# Patient Record
Sex: MALE | Race: WHITE | NOT HISPANIC OR LATINO | URBAN - METROPOLITAN AREA
[De-identification: names, ages, dates, MRNs, and addresses within clinical notes are randomized per-mention and may not be internally consistent; named-entity substitution may affect disease eponyms.]

---

## 2018-07-04 ENCOUNTER — HOSPITAL ENCOUNTER (INPATIENT)
Facility: MEDICAL CENTER | Age: 68
LOS: 4 days | DRG: 121 | End: 2018-07-08
Attending: INTERNAL MEDICINE | Admitting: INTERNAL MEDICINE
Payer: COMMERCIAL

## 2018-07-04 ENCOUNTER — HOSPITAL ENCOUNTER (OUTPATIENT)
Facility: MEDICAL CENTER | Age: 68
DRG: 121 | End: 2018-07-04
Payer: COMMERCIAL

## 2018-07-04 PROBLEM — C61 PROSTATE CANCER (HCC): Chronic | Status: ACTIVE | Noted: 2018-07-04

## 2018-07-04 PROBLEM — R79.89 LOW TESTOSTERONE: Chronic | Status: ACTIVE | Noted: 2018-07-04

## 2018-07-04 PROBLEM — H05.011 CELLULITIS OF RIGHT ORBITAL REGION: Status: ACTIVE | Noted: 2018-07-04

## 2018-07-04 PROBLEM — J44.9 CHRONIC OBSTRUCTIVE PULMONARY DISEASE (COPD) (HCC): Status: ACTIVE | Noted: 2018-07-04

## 2018-07-04 PROBLEM — F17.200 TOBACCO USE DISORDER: Chronic | Status: ACTIVE | Noted: 2018-07-04

## 2018-07-04 PROBLEM — I10 HYPERTENSION: Chronic | Status: ACTIVE | Noted: 2018-07-04

## 2018-07-04 PROBLEM — K21.9 GASTROESOPHAGEAL REFLUX DISEASE WITHOUT ESOPHAGITIS: Chronic | Status: ACTIVE | Noted: 2018-07-04

## 2018-07-04 LAB
ANION GAP SERPL CALC-SCNC: 8 MMOL/L (ref 0–11.9)
BUN SERPL-MCNC: 21 MG/DL (ref 8–22)
CALCIUM SERPL-MCNC: 9.9 MG/DL (ref 8.5–10.5)
CHLORIDE SERPL-SCNC: 102 MMOL/L (ref 96–112)
CO2 SERPL-SCNC: 28 MMOL/L (ref 20–33)
CREAT SERPL-MCNC: 1.17 MG/DL (ref 0.5–1.4)
GLUCOSE SERPL-MCNC: 114 MG/DL (ref 65–99)
POTASSIUM SERPL-SCNC: 4.5 MMOL/L (ref 3.6–5.5)
SODIUM SERPL-SCNC: 138 MMOL/L (ref 135–145)

## 2018-07-04 PROCEDURE — 700102 HCHG RX REV CODE 250 W/ 637 OVERRIDE(OP): Performed by: INTERNAL MEDICINE

## 2018-07-04 PROCEDURE — 93010 ELECTROCARDIOGRAM REPORT: CPT | Performed by: INTERNAL MEDICINE

## 2018-07-04 PROCEDURE — 700105 HCHG RX REV CODE 258: Performed by: INTERNAL MEDICINE

## 2018-07-04 PROCEDURE — 80048 BASIC METABOLIC PNL TOTAL CA: CPT

## 2018-07-04 PROCEDURE — A9270 NON-COVERED ITEM OR SERVICE: HCPCS | Performed by: OPHTHALMOLOGY

## 2018-07-04 PROCEDURE — 36415 COLL VENOUS BLD VENIPUNCTURE: CPT

## 2018-07-04 PROCEDURE — 770006 HCHG ROOM/CARE - MED/SURG/GYN SEMI*

## 2018-07-04 PROCEDURE — 93005 ELECTROCARDIOGRAM TRACING: CPT | Performed by: INTERNAL MEDICINE

## 2018-07-04 PROCEDURE — A9270 NON-COVERED ITEM OR SERVICE: HCPCS | Performed by: INTERNAL MEDICINE

## 2018-07-04 PROCEDURE — 700111 HCHG RX REV CODE 636 W/ 250 OVERRIDE (IP): Performed by: INTERNAL MEDICINE

## 2018-07-04 PROCEDURE — 87040 BLOOD CULTURE FOR BACTERIA: CPT | Mod: 91

## 2018-07-04 PROCEDURE — 700102 HCHG RX REV CODE 250 W/ 637 OVERRIDE(OP): Performed by: OPHTHALMOLOGY

## 2018-07-04 RX ORDER — FAMOTIDINE 20 MG/1
20 TABLET, FILM COATED ORAL DAILY
Status: DISCONTINUED | OUTPATIENT
Start: 2018-07-05 | End: 2018-07-08 | Stop reason: HOSPADM

## 2018-07-04 RX ORDER — OXYCODONE HYDROCHLORIDE AND ACETAMINOPHEN 5; 325 MG/1; MG/1
1 TABLET ORAL EVERY 4 HOURS PRN
Status: DISCONTINUED | OUTPATIENT
Start: 2018-07-04 | End: 2018-07-06

## 2018-07-04 RX ORDER — IBUPROFEN 600 MG/1
600 TABLET ORAL EVERY 6 HOURS PRN
Status: ON HOLD | COMMUNITY
End: 2018-07-08

## 2018-07-04 RX ORDER — CHLORTHALIDONE 25 MG/1
12.5 TABLET ORAL
Status: DISCONTINUED | OUTPATIENT
Start: 2018-07-05 | End: 2018-07-08 | Stop reason: HOSPADM

## 2018-07-04 RX ORDER — FAMOTIDINE 20 MG/1
20 TABLET, FILM COATED ORAL
COMMUNITY

## 2018-07-04 RX ORDER — ACETAMINOPHEN 325 MG/1
650 TABLET ORAL EVERY 6 HOURS PRN
Status: DISCONTINUED | OUTPATIENT
Start: 2018-07-04 | End: 2018-07-08 | Stop reason: HOSPADM

## 2018-07-04 RX ORDER — TAMSULOSIN HYDROCHLORIDE 0.4 MG/1
0.4 CAPSULE ORAL DAILY
COMMUNITY

## 2018-07-04 RX ORDER — BICALUTAMIDE 50 MG/1
50 TABLET, FILM COATED ORAL DAILY
COMMUNITY

## 2018-07-04 RX ORDER — ALBUTEROL SULFATE 90 UG/1
2 AEROSOL, METERED RESPIRATORY (INHALATION) EVERY 6 HOURS PRN
COMMUNITY

## 2018-07-04 RX ORDER — HEPARIN SODIUM 5000 [USP'U]/ML
5000 INJECTION, SOLUTION INTRAVENOUS; SUBCUTANEOUS EVERY 8 HOURS
Status: DISCONTINUED | OUTPATIENT
Start: 2018-07-04 | End: 2018-07-08 | Stop reason: HOSPADM

## 2018-07-04 RX ORDER — TAMSULOSIN HYDROCHLORIDE 0.4 MG/1
0.4 CAPSULE ORAL
Status: DISCONTINUED | OUTPATIENT
Start: 2018-07-04 | End: 2018-07-08 | Stop reason: HOSPADM

## 2018-07-04 RX ORDER — NICOTINE 21 MG/24HR
21 PATCH, TRANSDERMAL 24 HOURS TRANSDERMAL
Status: DISCONTINUED | OUTPATIENT
Start: 2018-07-05 | End: 2018-07-08 | Stop reason: HOSPADM

## 2018-07-04 RX ORDER — POTASSIUM CHLORIDE 20 MEQ/1
40 TABLET, EXTENDED RELEASE ORAL 2 TIMES DAILY
COMMUNITY

## 2018-07-04 RX ORDER — CARVEDILOL 25 MG/1
25 TABLET ORAL 2 TIMES DAILY WITH MEALS
COMMUNITY

## 2018-07-04 RX ORDER — CHLORTHALIDONE 25 MG/1
12.5 TABLET ORAL DAILY
COMMUNITY

## 2018-07-04 RX ORDER — AMOXICILLIN 250 MG
2 CAPSULE ORAL 2 TIMES DAILY
Status: DISCONTINUED | OUTPATIENT
Start: 2018-07-04 | End: 2018-07-08 | Stop reason: HOSPADM

## 2018-07-04 RX ORDER — CHOLECALCIFEROL (VITAMIN D3) 125 MCG
2000 CAPSULE ORAL DAILY
COMMUNITY

## 2018-07-04 RX ORDER — BISACODYL 10 MG
10 SUPPOSITORY, RECTAL RECTAL
Status: DISCONTINUED | OUTPATIENT
Start: 2018-07-04 | End: 2018-07-08 | Stop reason: HOSPADM

## 2018-07-04 RX ORDER — M-VIT,TX,IRON,MINS/CALC/FOLIC 27MG-0.4MG
1 TABLET ORAL DAILY
COMMUNITY

## 2018-07-04 RX ORDER — AMLODIPINE BESYLATE 5 MG/1
5 TABLET ORAL DAILY
COMMUNITY

## 2018-07-04 RX ORDER — POLYETHYLENE GLYCOL 3350 17 G/17G
1 POWDER, FOR SOLUTION ORAL
Status: DISCONTINUED | OUTPATIENT
Start: 2018-07-04 | End: 2018-07-08 | Stop reason: HOSPADM

## 2018-07-04 RX ORDER — LABETALOL HYDROCHLORIDE 5 MG/ML
10 INJECTION, SOLUTION INTRAVENOUS EVERY 4 HOURS PRN
Status: DISCONTINUED | OUTPATIENT
Start: 2018-07-04 | End: 2018-07-08 | Stop reason: HOSPADM

## 2018-07-04 RX ORDER — ACETAMINOPHEN 500 MG
500 TABLET ORAL 2 TIMES DAILY PRN
COMMUNITY

## 2018-07-04 RX ORDER — DORZOLAMIDE HYDROCHLORIDE AND TIMOLOL MALEATE 20; 5 MG/ML; MG/ML
1 SOLUTION/ DROPS OPHTHALMIC 2 TIMES DAILY
Status: DISCONTINUED | OUTPATIENT
Start: 2018-07-04 | End: 2018-07-08 | Stop reason: HOSPADM

## 2018-07-04 RX ORDER — CARVEDILOL 25 MG/1
25 TABLET ORAL 2 TIMES DAILY WITH MEALS
Status: DISCONTINUED | OUTPATIENT
Start: 2018-07-04 | End: 2018-07-08 | Stop reason: HOSPADM

## 2018-07-04 RX ORDER — ALBUTEROL SULFATE 90 UG/1
2 AEROSOL, METERED RESPIRATORY (INHALATION) EVERY 4 HOURS PRN
Status: DISCONTINUED | OUTPATIENT
Start: 2018-07-04 | End: 2018-07-08 | Stop reason: HOSPADM

## 2018-07-04 RX ORDER — OXYCODONE HYDROCHLORIDE AND ACETAMINOPHEN 5; 325 MG/1; MG/1
1-2 TABLET ORAL EVERY 4 HOURS PRN
Status: ON HOLD | COMMUNITY
End: 2018-07-08

## 2018-07-04 RX ORDER — AMLODIPINE BESYLATE 5 MG/1
5 TABLET ORAL
Status: DISCONTINUED | OUTPATIENT
Start: 2018-07-05 | End: 2018-07-08 | Stop reason: HOSPADM

## 2018-07-04 RX ADMIN — PIPERACILLIN AND TAZOBACTAM 4.5 G: 4; .5 INJECTION, POWDER, LYOPHILIZED, FOR SOLUTION INTRAVENOUS; PARENTERAL at 21:26

## 2018-07-04 RX ADMIN — CARVEDILOL 25 MG: 25 TABLET, FILM COATED ORAL at 20:08

## 2018-07-04 RX ADMIN — OXYCODONE HYDROCHLORIDE AND ACETAMINOPHEN 1 TABLET: 5; 325 TABLET ORAL at 19:12

## 2018-07-04 RX ADMIN — TAMSULOSIN HYDROCHLORIDE 0.4 MG: 0.4 CAPSULE ORAL at 20:08

## 2018-07-04 RX ADMIN — PIPERACILLIN AND TAZOBACTAM 4.5 G: 4; .5 INJECTION, POWDER, LYOPHILIZED, FOR SOLUTION INTRAVENOUS; PARENTERAL at 20:07

## 2018-07-04 RX ADMIN — OXYCODONE HYDROCHLORIDE AND ACETAMINOPHEN 1 TABLET: 5; 325 TABLET ORAL at 23:19

## 2018-07-04 RX ADMIN — HEPARIN SODIUM 5000 UNITS: 5000 INJECTION, SOLUTION INTRAVENOUS; SUBCUTANEOUS at 20:09

## 2018-07-04 RX ADMIN — DORZOLAMIDE HYDROCHLORIDE AND TIMOLOL MALEATE 1 DROP: 20; 5 SOLUTION/ DROPS OPHTHALMIC at 22:07

## 2018-07-04 RX ADMIN — VANCOMYCIN HYDROCHLORIDE 1200 MG: 100 INJECTION, POWDER, LYOPHILIZED, FOR SOLUTION INTRAVENOUS at 23:08

## 2018-07-04 ASSESSMENT — COPD QUESTIONNAIRES
DURING THE PAST 4 WEEKS HOW MUCH DID YOU FEEL SHORT OF BREATH: NONE/LITTLE OF THE TIME
DO YOU EVER COUGH UP ANY MUCUS OR PHLEGM?: NO/ONLY WITH OCCASIONAL COLDS OR INFECTIONS
IN THE PAST 12 MONTHS DO YOU DO LESS THAN YOU USED TO BECAUSE OF YOUR BREATHING PROBLEMS: AGREE
HAVE YOU SMOKED AT LEAST 100 CIGARETTES IN YOUR ENTIRE LIFE: YES
COPD SCREENING SCORE: 5

## 2018-07-04 ASSESSMENT — ENCOUNTER SYMPTOMS
CHILLS: 0
BACK PAIN: 0
FOCAL WEAKNESS: 0
SPUTUM PRODUCTION: 0
PHOTOPHOBIA: 0
LOSS OF CONSCIOUSNESS: 0
BLURRED VISION: 1
SENSORY CHANGE: 0
EYE PAIN: 0
SEIZURES: 0
WEAKNESS: 0
EYE DISCHARGE: 0
WEIGHT LOSS: 0
HEADACHES: 1
DOUBLE VISION: 0
NERVOUS/ANXIOUS: 0
ABDOMINAL PAIN: 0
CONSTIPATION: 0
EYE REDNESS: 0
FEVER: 0
WHEEZING: 1
DIAPHORESIS: 0
DIARRHEA: 0
SHORTNESS OF BREATH: 1
MYALGIAS: 0
DIZZINESS: 0
COUGH: 0
VOMITING: 0
SINUS PAIN: 0
PALPITATIONS: 0
BLOOD IN STOOL: 0
INSOMNIA: 0
SPEECH CHANGE: 0
HEMOPTYSIS: 0

## 2018-07-04 ASSESSMENT — LIFESTYLE VARIABLES
EVER HAD A DRINK FIRST THING IN THE MORNING TO STEADY YOUR NERVES TO GET RID OF A HANGOVER: YES
HAVE PEOPLE ANNOYED YOU BY CRITICIZING YOUR DRINKING: NO
ON A TYPICAL DAY WHEN YOU DRINK ALCOHOL HOW MANY DRINKS DO YOU HAVE: 2
EVER_SMOKED: YES
ALCOHOL_USE: YES
AVERAGE NUMBER OF DAYS PER WEEK YOU HAVE A DRINK CONTAINING ALCOHOL: 7
EVER FELT BAD OR GUILTY ABOUT YOUR DRINKING: NO
TOTAL SCORE: 1
CONSUMPTION TOTAL: POSITIVE
HOW MANY TIMES IN THE PAST YEAR HAVE YOU HAD 5 OR MORE DRINKS IN A DAY: 20
HAVE YOU EVER FELT YOU SHOULD CUT DOWN ON YOUR DRINKING: NO
TOTAL SCORE: 1
TOTAL SCORE: 1

## 2018-07-04 ASSESSMENT — COGNITIVE AND FUNCTIONAL STATUS - GENERAL
SUGGESTED CMS G CODE MODIFIER DAILY ACTIVITY: CH
DAILY ACTIVITIY SCORE: 24
SUGGESTED CMS G CODE MODIFIER MOBILITY: CH
MOBILITY SCORE: 24

## 2018-07-04 ASSESSMENT — PATIENT HEALTH QUESTIONNAIRE - PHQ9
SUM OF ALL RESPONSES TO PHQ9 QUESTIONS 1 AND 2: 0
SUM OF ALL RESPONSES TO PHQ9 QUESTIONS 1 AND 2: 0
2. FEELING DOWN, DEPRESSED, IRRITABLE, OR HOPELESS: NOT AT ALL
2. FEELING DOWN, DEPRESSED, IRRITABLE, OR HOPELESS: NOT AT ALL
1. LITTLE INTEREST OR PLEASURE IN DOING THINGS: NOT AT ALL
1. LITTLE INTEREST OR PLEASURE IN DOING THINGS: NOT AT ALL

## 2018-07-04 ASSESSMENT — PAIN SCALES - GENERAL
PAINLEVEL_OUTOF10: 6
PAINLEVEL_OUTOF10: 10
PAINLEVEL_OUTOF10: 8

## 2018-07-04 NOTE — PROGRESS NOTES
Patient is a direct admit form the [facility].   Dr Jim, [facility]  is accepting the patient.   ADT signed and held, needs to be released when the patient arrives on the unit.

## 2018-07-05 ENCOUNTER — APPOINTMENT (OUTPATIENT)
Dept: RADIOLOGY | Facility: MEDICAL CENTER | Age: 68
DRG: 121 | End: 2018-07-05
Attending: INTERNAL MEDICINE
Payer: COMMERCIAL

## 2018-07-05 LAB
ALBUMIN SERPL BCP-MCNC: 3.4 G/DL (ref 3.2–4.9)
ALBUMIN/GLOB SERPL: 1.2 G/DL
ALP SERPL-CCNC: 89 U/L (ref 30–99)
ALT SERPL-CCNC: 12 U/L (ref 2–50)
ANION GAP SERPL CALC-SCNC: 9 MMOL/L (ref 0–11.9)
APTT PPP: 31.8 SEC (ref 24.7–36)
AST SERPL-CCNC: 17 U/L (ref 12–45)
BASOPHILS # BLD AUTO: 0.7 % (ref 0–1.8)
BASOPHILS # BLD: 0.06 K/UL (ref 0–0.12)
BILIRUB SERPL-MCNC: 0.7 MG/DL (ref 0.1–1.5)
BUN SERPL-MCNC: 16 MG/DL (ref 8–22)
CALCIUM SERPL-MCNC: 8.9 MG/DL (ref 8.5–10.5)
CHLORIDE SERPL-SCNC: 104 MMOL/L (ref 96–112)
CO2 SERPL-SCNC: 22 MMOL/L (ref 20–33)
CREAT SERPL-MCNC: 0.9 MG/DL (ref 0.5–1.4)
EKG IMPRESSION: NORMAL
EOSINOPHIL # BLD AUTO: 0.19 K/UL (ref 0–0.51)
EOSINOPHIL NFR BLD: 2.1 % (ref 0–6.9)
ERYTHROCYTE [DISTWIDTH] IN BLOOD BY AUTOMATED COUNT: 48.1 FL (ref 35.9–50)
GLOBULIN SER CALC-MCNC: 2.9 G/DL (ref 1.9–3.5)
GLUCOSE SERPL-MCNC: 87 MG/DL (ref 65–99)
HCT VFR BLD AUTO: 39.7 % (ref 42–52)
HGB BLD-MCNC: 13.4 G/DL (ref 14–18)
IMM GRANULOCYTES # BLD AUTO: 0.05 K/UL (ref 0–0.11)
IMM GRANULOCYTES NFR BLD AUTO: 0.6 % (ref 0–0.9)
INR PPP: 1.12 (ref 0.87–1.13)
LYMPHOCYTES # BLD AUTO: 1.53 K/UL (ref 1–4.8)
LYMPHOCYTES NFR BLD: 17.3 % (ref 22–41)
MAGNESIUM SERPL-MCNC: 1.8 MG/DL (ref 1.5–2.5)
MCH RBC QN AUTO: 31.7 PG (ref 27–33)
MCHC RBC AUTO-ENTMCNC: 33.8 G/DL (ref 33.7–35.3)
MCV RBC AUTO: 93.9 FL (ref 81.4–97.8)
MONOCYTES # BLD AUTO: 0.83 K/UL (ref 0–0.85)
MONOCYTES NFR BLD AUTO: 9.4 % (ref 0–13.4)
NEUTROPHILS # BLD AUTO: 6.18 K/UL (ref 1.82–7.42)
NEUTROPHILS NFR BLD: 69.9 % (ref 44–72)
NRBC # BLD AUTO: 0 K/UL
NRBC BLD-RTO: 0 /100 WBC
PLATELET # BLD AUTO: 185 K/UL (ref 164–446)
PMV BLD AUTO: 10.9 FL (ref 9–12.9)
POTASSIUM SERPL-SCNC: 3.6 MMOL/L (ref 3.6–5.5)
PROT SERPL-MCNC: 6.3 G/DL (ref 6–8.2)
PROTHROMBIN TIME: 14.1 SEC (ref 12–14.6)
RBC # BLD AUTO: 4.23 M/UL (ref 4.7–6.1)
SODIUM SERPL-SCNC: 135 MMOL/L (ref 135–145)
VANCOMYCIN TROUGH SERPL-MCNC: 28.9 UG/ML (ref 10–20)
WBC # BLD AUTO: 8.8 K/UL (ref 4.8–10.8)

## 2018-07-05 PROCEDURE — 80202 ASSAY OF VANCOMYCIN: CPT

## 2018-07-05 PROCEDURE — 700111 HCHG RX REV CODE 636 W/ 250 OVERRIDE (IP): Performed by: INTERNAL MEDICINE

## 2018-07-05 PROCEDURE — 85610 PROTHROMBIN TIME: CPT

## 2018-07-05 PROCEDURE — 770006 HCHG ROOM/CARE - MED/SURG/GYN SEMI*

## 2018-07-05 PROCEDURE — 700105 HCHG RX REV CODE 258: Performed by: INTERNAL MEDICINE

## 2018-07-05 PROCEDURE — 700111 HCHG RX REV CODE 636 W/ 250 OVERRIDE (IP): Performed by: STUDENT IN AN ORGANIZED HEALTH CARE EDUCATION/TRAINING PROGRAM

## 2018-07-05 PROCEDURE — 700101 HCHG RX REV CODE 250: Performed by: OPHTHALMOLOGY

## 2018-07-05 PROCEDURE — 36415 COLL VENOUS BLD VENIPUNCTURE: CPT

## 2018-07-05 PROCEDURE — A9270 NON-COVERED ITEM OR SERVICE: HCPCS | Performed by: STUDENT IN AN ORGANIZED HEALTH CARE EDUCATION/TRAINING PROGRAM

## 2018-07-05 PROCEDURE — 85730 THROMBOPLASTIN TIME PARTIAL: CPT

## 2018-07-05 PROCEDURE — 700102 HCHG RX REV CODE 250 W/ 637 OVERRIDE(OP): Performed by: INTERNAL MEDICINE

## 2018-07-05 PROCEDURE — 99223 1ST HOSP IP/OBS HIGH 75: CPT | Mod: GC | Performed by: INTERNAL MEDICINE

## 2018-07-05 PROCEDURE — 83735 ASSAY OF MAGNESIUM: CPT

## 2018-07-05 PROCEDURE — 85025 COMPLETE CBC W/AUTO DIFF WBC: CPT

## 2018-07-05 PROCEDURE — A9270 NON-COVERED ITEM OR SERVICE: HCPCS | Performed by: INTERNAL MEDICINE

## 2018-07-05 PROCEDURE — 80053 COMPREHEN METABOLIC PANEL: CPT

## 2018-07-05 PROCEDURE — 700102 HCHG RX REV CODE 250 W/ 637 OVERRIDE(OP): Performed by: STUDENT IN AN ORGANIZED HEALTH CARE EDUCATION/TRAINING PROGRAM

## 2018-07-05 RX ORDER — ONDANSETRON 2 MG/ML
4 INJECTION INTRAMUSCULAR; INTRAVENOUS EVERY 4 HOURS PRN
Status: DISCONTINUED | OUTPATIENT
Start: 2018-07-05 | End: 2018-07-08 | Stop reason: HOSPADM

## 2018-07-05 RX ORDER — ERYTHROMYCIN 5 MG/G
OINTMENT OPHTHALMIC 3 TIMES DAILY
Status: DISCONTINUED | OUTPATIENT
Start: 2018-07-05 | End: 2018-07-08 | Stop reason: HOSPADM

## 2018-07-05 RX ORDER — ONDANSETRON 4 MG/1
8 TABLET, ORALLY DISINTEGRATING ORAL EVERY 8 HOURS PRN
Status: DISCONTINUED | OUTPATIENT
Start: 2018-07-05 | End: 2018-07-05

## 2018-07-05 RX ORDER — POTASSIUM CHLORIDE 20 MEQ/1
40 TABLET, EXTENDED RELEASE ORAL ONCE
Status: COMPLETED | OUTPATIENT
Start: 2018-07-05 | End: 2018-07-05

## 2018-07-05 RX ORDER — LORAZEPAM 0.5 MG/1
0.5 TABLET ORAL
Status: COMPLETED | OUTPATIENT
Start: 2018-07-05 | End: 2018-07-05

## 2018-07-05 RX ADMIN — DORZOLAMIDE HYDROCHLORIDE AND TIMOLOL MALEATE 1 DROP: 20; 5 SOLUTION/ DROPS OPHTHALMIC at 09:29

## 2018-07-05 RX ADMIN — AMLODIPINE BESYLATE 5 MG: 5 TABLET ORAL at 09:29

## 2018-07-05 RX ADMIN — STANDARDIZED SENNA CONCENTRATE AND DOCUSATE SODIUM 2 TABLET: 8.6; 5 TABLET, FILM COATED ORAL at 09:31

## 2018-07-05 RX ADMIN — VITAMIN D, TAB 1000IU (100/BT) 2000 UNITS: 25 TAB at 09:28

## 2018-07-05 RX ADMIN — LORAZEPAM 0.5 MG: 0.5 TABLET ORAL at 19:55

## 2018-07-05 RX ADMIN — HEPARIN SODIUM 5000 UNITS: 5000 INJECTION, SOLUTION INTRAVENOUS; SUBCUTANEOUS at 06:00

## 2018-07-05 RX ADMIN — PIPERACILLIN AND TAZOBACTAM 4.5 G: 4; .5 INJECTION, POWDER, LYOPHILIZED, FOR SOLUTION INTRAVENOUS; PARENTERAL at 23:44

## 2018-07-05 RX ADMIN — TAMSULOSIN HYDROCHLORIDE 0.4 MG: 0.4 CAPSULE ORAL at 17:25

## 2018-07-05 RX ADMIN — ONDANSETRON 4 MG: 2 INJECTION, SOLUTION INTRAMUSCULAR; INTRAVENOUS at 11:33

## 2018-07-05 RX ADMIN — OXYCODONE HYDROCHLORIDE AND ACETAMINOPHEN 1 TABLET: 5; 325 TABLET ORAL at 09:45

## 2018-07-05 RX ADMIN — OXYCODONE HYDROCHLORIDE AND ACETAMINOPHEN 1 TABLET: 5; 325 TABLET ORAL at 14:27

## 2018-07-05 RX ADMIN — ERYTHROMYCIN: 5 OINTMENT OPHTHALMIC at 19:56

## 2018-07-05 RX ADMIN — CARVEDILOL 25 MG: 25 TABLET, FILM COATED ORAL at 17:23

## 2018-07-05 RX ADMIN — CARVEDILOL 25 MG: 25 TABLET, FILM COATED ORAL at 09:28

## 2018-07-05 RX ADMIN — NICOTINE 21 MG: 21 PATCH, EXTENDED RELEASE TRANSDERMAL at 06:00

## 2018-07-05 RX ADMIN — DORZOLAMIDE HYDROCHLORIDE AND TIMOLOL MALEATE 1 DROP: 20; 5 SOLUTION/ DROPS OPHTHALMIC at 19:56

## 2018-07-05 RX ADMIN — PIPERACILLIN AND TAZOBACTAM 4.5 G: 4; .5 INJECTION, POWDER, LYOPHILIZED, FOR SOLUTION INTRAVENOUS; PARENTERAL at 16:09

## 2018-07-05 RX ADMIN — OXYCODONE HYDROCHLORIDE AND ACETAMINOPHEN 1 TABLET: 5; 325 TABLET ORAL at 06:12

## 2018-07-05 RX ADMIN — STANDARDIZED SENNA CONCENTRATE AND DOCUSATE SODIUM 2 TABLET: 8.6; 5 TABLET, FILM COATED ORAL at 19:55

## 2018-07-05 RX ADMIN — ACETAMINOPHEN 650 MG: 325 TABLET, FILM COATED ORAL at 00:50

## 2018-07-05 RX ADMIN — ERYTHROMYCIN: 5 OINTMENT OPHTHALMIC at 11:41

## 2018-07-05 RX ADMIN — PIPERACILLIN AND TAZOBACTAM 4.5 G: 4; .5 INJECTION, POWDER, LYOPHILIZED, FOR SOLUTION INTRAVENOUS; PARENTERAL at 06:00

## 2018-07-05 RX ADMIN — OXYCODONE HYDROCHLORIDE AND ACETAMINOPHEN 1 TABLET: 5; 325 TABLET ORAL at 19:19

## 2018-07-05 RX ADMIN — FAMOTIDINE 20 MG: 20 TABLET ORAL at 09:28

## 2018-07-05 RX ADMIN — VANCOMYCIN HYDROCHLORIDE 1200 MG: 100 INJECTION, POWDER, LYOPHILIZED, FOR SOLUTION INTRAVENOUS at 11:35

## 2018-07-05 RX ADMIN — POTASSIUM CHLORIDE 40 MEQ: 1500 TABLET, EXTENDED RELEASE ORAL at 06:13

## 2018-07-05 RX ADMIN — CHLORTHALIDONE 12.5 MG: 25 TABLET ORAL at 09:28

## 2018-07-05 RX ADMIN — HEPARIN SODIUM 5000 UNITS: 5000 INJECTION, SOLUTION INTRAVENOUS; SUBCUTANEOUS at 14:24

## 2018-07-05 ASSESSMENT — PAIN SCALES - GENERAL
PAINLEVEL_OUTOF10: 10
PAINLEVEL_OUTOF10: 6
PAINLEVEL_OUTOF10: 8
PAINLEVEL_OUTOF10: 10
PAINLEVEL_OUTOF10: 10
PAINLEVEL_OUTOF10: 6
PAINLEVEL_OUTOF10: 8
PAINLEVEL_OUTOF10: 6

## 2018-07-05 ASSESSMENT — ENCOUNTER SYMPTOMS
WEAKNESS: 0
BLURRED VISION: 0
LOSS OF CONSCIOUSNESS: 0
SENSORY CHANGE: 0
ABDOMINAL PAIN: 0
EYE DISCHARGE: 0
SEIZURES: 0
FEVER: 0
PHOTOPHOBIA: 0
MYALGIAS: 0
SPEECH CHANGE: 0
CONSTIPATION: 0
DIARRHEA: 0
PND: 0
EYE PAIN: 1
EYE REDNESS: 1
BACK PAIN: 0
BRUISES/BLEEDS EASILY: 0
FOCAL WEAKNESS: 0
SPUTUM PRODUCTION: 1
SORE THROAT: 0
PALPITATIONS: 0
COUGH: 1
INSOMNIA: 0
DOUBLE VISION: 0
ORTHOPNEA: 0
NERVOUS/ANXIOUS: 0
CHILLS: 0
VOMITING: 0
HEADACHES: 0
SINUS PAIN: 0

## 2018-07-05 NOTE — CARE PLAN
Problem: Infection  Goal: Will remain free from infection    Intervention: Assess signs and symptoms of infection  Assessing and monitoring patient vital signs and lab values. Patient receiving IV vancomycin and zosyn ABX, refer to MAR. Using standard precautions, while providing care to patient.       Problem: Pain Management  Goal: Pain level will decrease to patient's comfort goal    Intervention: Follow pain managment plan developed in collaboration with patient and Interdisciplinary Team  Monitoring patient pain level. Patient states 10/10 right eye pain, patient medicated with PRN pain medication, refer to MAR. Patient educated to notify RN if pain is worsen, patient verbalizes understanding.

## 2018-07-05 NOTE — RESPIRATORY CARE
COPD EDUCATION by COPD CLINICAL EDUCATOR  7/5/2018 at 7:32 AM by Danni Oneill     Patient reviewed by COPD education team. Patient does not qualify for COPD program at this time

## 2018-07-05 NOTE — ASSESSMENT & PLAN NOTE
- Pt on Chlorthalidone, Norvasc, Coreg.  - K level is 3.2 today, to continue thiazide and Norvasc on discharge.  - BP is 145/92 today, no headache, blurred vision, dizziness, sweating.  - HR has been trending in normal range  - Pt advised about cardiovascular benefits of smoking cessation.

## 2018-07-05 NOTE — H&P
SENIOR ADMIT NOTE    DATE OF SERVICE:  07/04/2018    CHIEF COMPLAINT:  Swelling of right eye, pain of right eye.    HISTORY OF PRESENT ILLNESS:  In brief, this is a very pleasant 68-year-old   male with past medical history significant for metastatic prostate cancer,   COPD, alcohol-induced pancreatitis, hypertension, tobacco use disorder,   presents as a transfer from the [facility] to [facility] for further evaluation of   orbital cellulitis by an ophthalmologist.    Patient's symptoms began on Monday, when he started having right eye pain and   swelling.  He states that it progressively grew worse and was finally   evaluated by home health nurse who advised him to go into the ER.  He   describes the pain as sharp, stabbing, throbbing, 10/10 and radiating to his   right frontal, temporal regions.  Only thing which relieved the pain is   Percocet, which [facility].  He denies fevers, chills,   drainage from the eyes, nausea, or vomiting.  He denies any recent trauma to   the eye.  He does endorse right eye blurriness, photosensitivity, and pain   with right eye movement.    [facility] CT of the orbit showed a preseptal and orbital cellulitis and he   was started on vancomycin and Zosyn.  Since there was no ophthalmology   consultation at the [facility] during a holiday, he was transferred over here to   [facility] for ophthalmological evaluation.  He did not meet SIRS criteria [facility] the   [facility].    PAST MEDICAL HISTORY:  1.  Chronic obstructive pulmonary disease.  2.  Hypertension.  3.  History of alcohol-induced pancreatitis.  4.  Stage IV prostate cancer, currently under remission.  5.  Tobacco use disorder.  6.  Gastroesophageal reflux disease.    PAST SURGICAL HISTORY:  Denies previous history of surgeries.    FAMILY HISTORY:  Reports esophageal cancer in brother, pancreatic cancer in   sister.    SOCIAL HISTORY:  Smokes 1 pack per day, drinks 2 glasses of gin per day and   denies any recreational drug  use.    MEDICATIONS:  1.  Albuterol q. 4 hours p.r.n.  2.  Amlodipine 5 mg daily.  3.  Bicalutamide 50 mg daily for prostate.  4.  Carvedilol 25 mg b.i.d.  5.  Chlorthalidone 12.5 mg daily.  6.  Cholecalciferol 2000 units daily.  7.  Famotidine 20 mg daily p.r.n.  8.  Magnesium oxide 420 mg daily.  9.  Nicotine patch.  10.  Olodaterol 2.5 mcg 2 puffs by mouth daily.  11.  Tamsulosin 0.4 mg daily.  12.  Vancomycin.  13.  Zosyn.    ALLERGIES:  No known drug allergies.    REVIEW OF SYSTEMS:  CONSTITUTIONAL:  Denies fevers, chills.  EYES:  Reports blurriness, pain with eye movement.  HENT:  Denies nasal congestion, sore throat.  RESPIRATORY:  Reports chronic dyspnea due to COPD, chronic cough due to COPD.  CARDIOVASCULAR:  Denies chest pain, palpitations.  GASTROINTESTINAL:  Denies nausea, vomiting, abdominal pain.  GENITOURINARY:  Denies dysuria, increased frequency.  NEUROLOGICAL:  Denies focal deficits, seizures.    PHYSICAL EXAMINATION:  VITAL SIGNS:  On admission, afebrile, heart rate 61, respiratory rate 16,   blood pressure 123/63, O2 saturation of 92% on room air.  CONSTITUTIONAL:  Appears comfortable.  HENT:  Normocephalic and atraumatic.  EYES:  Right eye swelling and erythema noticed.  Full range of EOM present,   mild photosensitivity present.  Erythema noticed around the right eye.    Patient is able to open both eyes.  CARDIOVASCULAR:  Regular rate and rhythm.  No murmurs, rubs, or gallops.  RESPIRATORY:  Clear to auscultation bilaterally.  ABDOMEN:  Soft, nontender.  Normal bowel sounds.  EXTREMITIES:  No pedal edema.  NEUROLOGIC:  No focal deficits noticed, alert and oriented x3.    LABORATORY DATA:  Significant for WBC count of 9.01, hemoglobin of 13.9,   platelet count of 202.  Potassium was 3.4, BUN of 17, creatinine of 1.1, CO2   of 28, glucose of 96.    IMAGING:  CT of the orbits with and without contrast [facility] the [facility], which   showed preseptal and orbital cellulitis with paranasal sinus  involvement.    ASSESSMENT AND PLAN:  1.  Orbital cellulitis.  The patient does appear to have orbital cellulitis   per imaging from the [facility], does have full range of motion, currently also has   some pain with movement, able to fully open his eyes.  Visual testing at the   [facility] showed 20/32 by Snellen chart.  He will need aggressive antibiotic   coverage, and therefore, vancomycin and Zosyn were continued on admission.    Blood cultures have been ordered, although there were no systemic inflammatory   response syndrome criteria detected.  Ophthalmology was consulted and Dr. Casarez has kindly agreed to see the patient.  For further evaluation, an MRI   with and without contrast of the orbits was also ordered and pending at this   moment. He will also have Q4hr cranial nerve checks for the moment.  2.  Hypokalemia.  Potassium was 3.4 at the [facility], was replaced at the [facility].  We   will repeat BMP and replete if necessary.  3.  Hypertension, continue patient's home medications of amlodipine,   carvedilol, and chlorthalidone.  Blood pressure was stable on admission.  4.  Chronic obstructive pulmonary disease:  Currently stable, not aggravated.    Continue albuterol p.r.n. for shortness of breath.  He is on olodaterol at   home, but this drug does not appear to on forumulary at [facility].  5.  Gastroesophageal reflux disease:  Continue Pepcid.  6.  Tobacco use disorder:  Nicotine patch was ordered, counseled the patient   extensively via tobacco smoking cessation.  7.  Alcohol use disorder:  Patient reports to drinking 2 drinks daily.  Does   not appear to be withdrawing at the moment, however, low threshold to be   maintained.  He does not report history of withdrawals when he was   hospitalized in the hospital previously.    CODE STATUS:  DNR.    Core measures have been addressed appropriately.  Please refer to the intern's   history and physical note for more details.       ____________________________________     Colton Hogan  MD JACKSON / GRAYSON    DD:  07/04/2018 19:22:19  DT:  07/04/2018 21:23:31    D#:  [id number]  Job#:  [id number]

## 2018-07-05 NOTE — ASSESSMENT & PLAN NOTE
- [facility] shows [facility] and extensive paranasal sinus involvement  - Pt has refused to undergo MRI due to claustrophobia   - Pt exhibits no signs of altered sensorium, is oriented x3  - No external ophthalmoplegia, EOMs painless  - Inflammation around eye has significantly diminished today, no conjunctival injection  - Pt has b/l miosis unreactive to light or accomodation, has had so documented since being evaluated at the [facility]  - Pt does not give h/o opiate use/ prior stroke  - Pt switched over to PO Levaquin during discharge  - Dorzolamide-Timolol eyedrops and Erythromycin topical to continue as per Ophthalmology  - Temperatures and WBC count trending in normal range  - Conservative management in absence of extension of infection beyond preseptal compartment  - Ophthalmology consulted, pt to be discharged today

## 2018-07-05 NOTE — CONSULTS
DATE OF SERVICE:  07/04/2018    OPHTHALMOLOGY CONSULTATION    CHIEF COMPLAINT:  Swelling around the right eye.    HISTORY OF PRESENT ILLNESS:  This is a patient who [facility]   for swelling around the right eye with some concern for orbital cellulitis.    The patient had a [facility] at the [facility], which was concerning and was transferred   to [facility].  Per patient, he has some pain in the eye.  The vision is doing   well.  He did not have any pain when he was moving his eye.  Past ocular   history is unremarkable.    PHYSICAL EXAMINATION:  Exam showed visual acuity were normal with near exam.    IOP in the right eye was 28, left eye was 21.  Motility was full.  Right   eyelid has swelling superiorly and inferiorly.  1+ conjunctival injection in   the right eye.  Cornea was clear.  Anterior chamber was deep and quiet.  Left   eye exam was within normal limits.  Pupil was round with no APD.    ASSESSMENT:  This is a patient with preseptal cellulitis with some concern for   orbital cellulitis per CT [facility] the [facility], but full motility at this point   with no pain on eye movements.  Patient had elevated intraocular pressure,   likely from orbital swelling.  The pressure is not very high and the patient   does not require a canthotomy at this point.    PLAN:  1.  Lower intraocular pressure with Cosopt drop, 1 drop twice a day in the   right eye.  2.  Erythromycin ointment 3 times a day, right eye.  3.  Continue IV antibiotics until cellulitis resolves.  4.  We will follow the patient in the next 1-2 days.       ____________________________________     MD CHARLES ESTRADA / GRAYSON    DD:  07/04/2018 23:44:53  DT:  07/05/2018 00:45:34    D#:  [id number]  Job#:  [id number]

## 2018-07-05 NOTE — CARE PLAN
Problem: Safety  Goal: Will remain free from injury  Outcome: PROGRESSING AS EXPECTED  Fall precautions/hourly rounding maintained, call light within reach and functioning, all items within reach.  Patient encouraged to call for assistance, poc reviewed with patient, ?'s/concerns answered.

## 2018-07-05 NOTE — PROGRESS NOTES
"Pharmacy Kinetics 68 y.o. male on vancomycin day # 1 2018    Currently on Vancomycin new start    Indication for Treatment: orbital cellulitis    Pertinent history per medical record: Admitted on 2018 for orbital cellulitis.  [facility] and Unasyn. He transferred to [facility] for higher level of care. Patient had redness, swelling and pain around his R eye with blurred vision. He was found to have orbital cellulitis with [facility] Empiric antibiotics initiated for orbital cellulitis.     Other antibiotics: Zosyn 4.5 g IV q8h    Allergies: Patient has no known allergies.     List concerns for renal function: age, BMI 35    Pertinent cultures to date:    PBC x 2: in process    Recent Labs      18   1847   BUN  21   CREATININE  1.17      Blood pressure 123/63, pulse 61, temperature 35.9 °C (96.6 °F), resp. rate 16, height 1.702 m (5' 7\"), weight 105.7 kg (233 lb 0.4 oz), SpO2 92 %. Temp (24hrs), Av.9 °C (96.6 °F), Min:35.9 °C (96.6 °F), Max:35.9 °C (96.6 °F)      A/P   1. Vancomycin dose change: initiate 1200 mg IV q12h  2. Next vancomycin level:  @ 1030  3. Goal trough: 12-16 mcg/mL  4. Comments: patient started on vancomycin at [facility]. Received 2600 mg IV @ 2300 7/3, followed by 1200 mg @ 1100 . This is slightly below our dosing recommendations, but considering BMI 35, will continue with this dose for now. Level planned for tomorrow AM (prior to 4th total dose - including doses from [facility]). Unknown baseline renal function, but SCr here 1.17. Blood cultures in process. Opthalmology to see patient. Further imaging pending. Will follow.    Laura Crow, PharmD    "

## 2018-07-05 NOTE — PROGRESS NOTES
IV present, no lines otherwise, no skin breakdown noted. Right heel dry/calloused. Patient independent in room - steady gait. Turns and repositions independently.

## 2018-07-05 NOTE — ASSESSMENT & PLAN NOTE
- Cough present with mild expiratory wheeze, non productive  - No dyspnea at rest, chest is clear today  - Pt counseled about smoking cessation and impact on mortality, has refused smoking cessation therapy  - Pt on PRN Albuterol inhaled  - Pt to be advised CT chest (as per USPSTF guidelines) on outpatient basis

## 2018-07-05 NOTE — H&P
Internal Medicine Admitting History and Physical    Note Author: Ino Salas M.D.       Name Feliz Blackmon     1950   Age/Sex 68 y.o. male   MRN [id number]   Code Status DNI     After 5PM or if no immediate response to page, please call for cross-coverage  Attending/Team: Dr Jim / Tariq See Patient List for primary contact information  Call [phone] to page    1st Call - Day Intern (R1):   Dr Salas 2nd Call - Day Sr. Resident (R2/R3):   Dr Hogan       Chief Complaint:   Swelling, redness and pain around the right eye x1day    HPI:  [facility] of redness, swelling, pain around the R eye and blurring of vision in both eyes. Pain was of sudden onset, is constant, throbbing in character in the upper and lower eyelids, with an intensity of 7/10, alleviated intermittently by oxycodone analgesia. Patient also has headache in the right temple which is throbbing in nature. Patient has no history of fevers, chills, trauma to the eye or use of contact lenses.  [facility] the [facility] and found to have orbital cellulitis with extensive paranasal sinus involvement on CT scan. Pt was started on Vancomycin and Zosyn at the [facility], Snellen chart evaluation showed visual acuity of 20/32. Patient still has intact intraocular movements and was transferred to [facility] to consult with Ophthalmology.    Review of Systems   Constitutional: Negative for chills, diaphoresis, fever and weight loss.   HENT: Negative for congestion, nosebleeds and sinus pain.    Eyes: Positive for blurred vision. Negative for double vision, photophobia, pain, discharge and redness.   Respiratory: Positive for shortness of breath and wheezing. Negative for cough, hemoptysis and sputum production.    Cardiovascular: Negative for chest pain and palpitations.   Gastrointestinal: Negative for abdominal pain, blood in stool, constipation, diarrhea and vomiting.   Genitourinary: Negative for dysuria and  frequency.   Musculoskeletal: Negative for back pain, joint pain and myalgias.   Skin: Negative for rash.   Neurological: Positive for headaches. Negative for dizziness, sensory change, speech change, focal weakness, seizures, loss of consciousness and weakness.   Endo/Heme/Allergies: Negative for environmental allergies.   Psychiatric/Behavioral: The patient is not nervous/anxious and does not have insomnia.              Past Medical History (Chronic medical problem, known complications and current treatment)    Pt is a known hypertensive on Carvedilol, Amlodipine, Chlorthalidone.  Pt has stage 4 prostate cancer post chemotherapy and currently on antiandrogen therapy.  Pt is not a diabetic.  Pt is a chronic smoker with known COPD.    Past Surgical History:  No history of surgeries in the past..    Current Outpatient Medications:  Home Medications     Reviewed by Seb Cardona R.N. (Registered Nurse) on 07/04/18 at 1726  Med List Status: Not Addressed   Medication Last Dose Status        Patient taking Chlorthalidone, Norvasc, Coreg for hyoertension  Pt takes Bicalutamide for prostate cancer  Taking Albuterol inhaled for COPD  Cholecalciferol   Famotidine  Motrin  Theragran multivitamin  Percocet 5-325 mg  Flomax 0.4 mg  Tylenol 500 mg                           Medication Allergy/Sensitivities:  No Known Allergies      Family History (mandatory)   Sister had pancreatic cancer, brother had esophageal cancer.    Social History (mandatory)   Social History     Social History   • Marital status: N/A     Spouse name: N/A   • Number of children: N/A   • Years of education: N/A     Occupational History   • Not on file.     Social History Main Topics   • Smoking status: Current Every Day Smoker     Packs/day: 1.00     Start date: 7/1/1969   • Smokeless tobacco: Never Used   • Alcohol use Yes      Comment: 2 drinks per night (gin)   • Drug use: No   • Sexual activity: Not on file     Other Topics Concern   • Not on file  "    Social History Narrative   • No narrative on file     Living situation: Lives in [place]  PCP : No primary care provider on file.    Physical Exam     Vitals:    07/04/18 1625 07/04/18 1652   BP: 123/63    Pulse: 61    Resp: 16    Temp: 35.9 °C (96.6 °F)    SpO2: 92%    Weight:  105.7 kg (233 lb 0.4 oz)   Height:  1.702 m (5' 7\")     Body mass index is 36.5 kg/m².  /63   Pulse 61   Temp 35.9 °C (96.6 °F)   Resp 16   Ht 1.702 m (5' 7\")   Wt 105.7 kg (233 lb 0.4 oz)   SpO2 92%   BMI 36.50 kg/m²   O2 therapy: Pulse Oximetry: 92 %, O2 (LPM): 0, O2 Delivery: None (Room Air)    Physical Exam   Constitutional: He is oriented to person, place, and time and well-developed, well-nourished, and in no distress. No distress.   HENT:   Head: Normocephalic and atraumatic.   Right Ear: External ear normal.   Left Ear: External ear normal.   Nose: Nose normal.   Mouth/Throat: Oropharynx is clear and moist. No oropharyngeal exudate.   Eyes: Conjunctivae and EOM are normal. Right eye exhibits no discharge. Left eye exhibits no discharge. No scleral icterus.   Pinpoint pupils unreactive to light   Neck: Normal range of motion. No thyromegaly present.   Cardiovascular: Normal rate, regular rhythm, normal heart sounds and intact distal pulses.  Exam reveals no gallop and no friction rub.    No murmur heard.  Pulmonary/Chest: Effort normal. No respiratory distress. He has wheezes. He has no rales. He exhibits no tenderness.   Mild expiratory wheeze   Abdominal: Soft. He exhibits no distension. There is no tenderness.   Musculoskeletal: Normal range of motion. He exhibits no tenderness.   Lymphadenopathy:     He has no cervical adenopathy.   Neurological: He is alert and oriented to person, place, and time. He exhibits normal muscle tone. Gait normal. GCS score is 15.   Skin: Skin is warm and dry. No rash noted. He is not diaphoretic. No erythema.   Psychiatric: Mood, memory, affect and judgment normal.         Data " Review       Old Records Request:   Completed  Current Records review/summary: Completed    Lab Data Review:  No results found for this or any previous visit (from the past 24 hour(s)).    Imaging/Procedures Review:    Independant Imaging Review: Completed  MR-ORBITS, FACE, NECK-WITH    (Results Pending)        CT scan ([facility]): Right sided orbital cellulitis, with extensive involvement of the paranasal sinus.    EKG:   EKG Independant Review: pending    Records reviewed and summarized in current documentation :  Yes  [facility] teaching service handout given to patient:  No         Assessment/Plan     Orbital cellulitis  Assessment and Plan:  - CT showed orbital cellulitis with extensive paranasal sinus involvement  - Ophthalmology will be seeing patient today  - MRI scheduled  - Neuro checks q4h  - To trend vitals (temperature)    Hypertension  Assessment and Plan:  - Pt on Carvedilol, Chlorthalidone, Amlodipine  - To trend BP    Hypokalemia  Assessment and Plan:  - CMP in AM  - Pt was transferred on KCl 40mEq po BID    Prostate cancer  Assessment and Plan:  - patient is on antiandrogen therapy, has symptoms of androgen defiency (gynecomastia)    COPD  Assessment and Plan:  - 40 pack year smoking history  - Pt on beta agonist bronchodilators  - Pt has no respiratory distress or dyspnea at rest  - Lungs have mild expiratory wheeze on auscultation  - to continue present therapy     Anticipated Hospital stay:  >2 midnights        Quality Measures  Quality-Core Measures   Reviewed items::  Medications reviewed and Radiology images reviewed  Kirkpatrick catheter::  No Kirkpatrick  Ulcer Prophylaxis::  Not indicated    PCP: No primary care provider on file.

## 2018-07-05 NOTE — PROGRESS NOTES
Patient a+o x 4, denies sob/lightheadedness/numbness/tingling/dizziness/chest pain/v/d. +nausea, paged team for antiemetic. awaiting orders. +periorbital edema to right eye, sclera pink, scant clear drainage. +blurry vision in both eyes per patient, iv abx a/o afebrile, vss. Independent in room - steady gait. Tolerating diet. Fall precautions/hourly rounding maintained, call light within reach and functioning, all items within reach.  Patient encouraged to call for assistance, poc reviewed with patient, ?'s/concerns answered.     IV access lost. MD paged and requested u/s guided IV 2/2 patient hard stick. Awaiting order. IV zosyn stopped.

## 2018-07-05 NOTE — PROGRESS NOTES
Internal Medicine Interval Note  Note Author: Ino Salas M.D.     Name Feliz Blackmon     1950   Age/Sex 68 y.o. male   MRN [id number]   Code Status DNAR/DNI     After 5PM or if no immediate response to page, please call for cross-coverage  Attending/Team: Dr Jim / Tariq See Patient List for primary contact information  Call [phone] to page    1st Call - Day Intern (R1):   Dr Salas 2nd Call - Day Sr. Resident (R2/R3):   Dr Hogan         Reason for interval visit  (Principal Problem)   Redness, pain and swelling of R orbital region      Interval Problem Daily Status Update  (24 hours, problem oriented, brief subjective history, new lab/imaging data pertinent to that problem)   Pt was [facility] for possible [facility] cellulitis, in hospital has been stable with normally trending temperatures and WBC count. Was evaluated by Ophthalmology as preseptal cellulitis, advised IV and local antibiotics, and conservative management. No acute events overnight.      Review of Systems   Constitutional: Negative for chills and fever.   HENT: Negative for hearing loss, sinus pain, sore throat and tinnitus.    Eyes: Positive for pain and redness. Negative for blurred vision, double vision, photophobia and discharge.   Respiratory: Positive for cough and sputum production.    Cardiovascular: Negative for chest pain, palpitations, orthopnea and PND.   Gastrointestinal: Negative for abdominal pain, constipation, diarrhea and vomiting.   Genitourinary: Negative for dysuria and frequency.   Musculoskeletal: Negative for back pain, joint pain and myalgias.   Skin: Negative for rash.   Neurological: Negative for sensory change, speech change, focal weakness, seizures, loss of consciousness, weakness and headaches.   Endo/Heme/Allergies: Negative for environmental allergies. Does not bruise/bleed easily.   Psychiatric/Behavioral: The patient is not nervous/anxious and does not  "have insomnia.        Disposition/Barriers to discharge:   Resolution of orbital cellulitis    Consultants/Specialty  Mihaela Smith MD Ophthalmology  PCP: No PCP listed on file.      Quality Measures  Quality-Core Measures   Reviewed items::  Medications reviewed, Labs reviewed and Radiology images reviewed  Kirkpatrick catheter::  No Kirkpatrick  DVT prophylaxis - mechanical:  SCDs  Ulcer Prophylaxis::  Not indicated          Physical Exam       Vitals:    07/04/18 1625 07/04/18 1652 07/04/18 1950 07/05/18 0455   BP: 123/63  149/84 135/64   Pulse: 61  (!) 54 (!) 51   Resp: 16  18 19   Temp: 35.9 °C (96.6 °F)  36.2 °C (97.1 °F) 36.3 °C (97.4 °F)   SpO2: 92%  97% 94%   Weight:  105.7 kg (233 lb 0.4 oz)     Height:  1.702 m (5' 7\")       Body mass index is 36.5 kg/m². Weight: 105.7 kg (233 lb 0.4 oz)  Oxygen Therapy:  Pulse Oximetry: 94 %, O2 (LPM): 0, O2 Delivery: None (Room Air)    Physical Exam   Constitutional: He is oriented to person, place, and time and well-developed, well-nourished, and in no distress. No distress.   HENT:   Head: Normocephalic and atraumatic.   Right Ear: External ear normal.   Left Ear: External ear normal.   Nose: Nose normal.   Mouth/Throat: Oropharynx is clear and moist. No oropharyngeal exudate.   Eyes: Conjunctivae and EOM are normal. Left eye exhibits no discharge. No scleral icterus.   Pinpoint pupils not reactive to light and accomodation   Neck: Normal range of motion.   Cardiovascular: Normal rate, regular rhythm, normal heart sounds and intact distal pulses.  Exam reveals no gallop and no friction rub.    No murmur heard.  Pulmonary/Chest: Effort normal. No respiratory distress. He has wheezes. He exhibits no tenderness.   Abdominal: Soft. He exhibits no distension. There is no tenderness. There is no rebound.   Musculoskeletal: Normal range of motion. He exhibits no edema, tenderness or deformity.   Lymphadenopathy:     He has no cervical adenopathy.   Neurological: He is alert and oriented " to person, place, and time. He exhibits normal muscle tone. GCS score is 15.   Skin: Skin is warm and dry. There is erythema.   Erythema, edema surrounding the orbit   Psychiatric: Mood, memory, affect and judgment normal.   Vitals reviewed.            Assessment/Plan     * Cellulitis of right orbital region- (present on admission)   Assessment & Plan    - [facility] and extensive paranasal sinus involvement  - to follow results of the MRI  - No external ophthalmoplegia, EOMs painless  - Inflammation around eye present, no conjunctival injection  - Pt on IV antibiotics Vancomycin and Zosyn  - Dorzolamide-Timolol eyedrops and Erythromycin topical added as per Ophthalmology  - Temperatures and WBC count trending in normal range  - Conservative management in absence of extension of infection beyond preseptal compartment        Prostate cancer (HCC)- (present on admission)   Assessment & Plan    - Pt on Bicalutamide, has no symptoms of adverse effects  - Pt has gynecomastia  - Pt has straining, is on Tamsulosin  - No recent weight loss, fevers, bone/ back pain in recent past        Hypertension- (present on admission)   Assessment & Plan    - Pt on Chlorthalidone, Norvasc, Coreg.  - BP trending in the normal range.  - Pt advised about cardiovascular benefits of smoking cessation        Chronic obstructive pulmonary disease (COPD) ([facility])   Assessment & Plan    - Cough present with mild expiratory wheeze  - Pt counseled about smoking cessation and impact on mortality  - Pt on PRN Albuterol inhaled  - Pt to be advised CT chest (as per USPSTF guidelines) on outpatient basis

## 2018-07-05 NOTE — PROGRESS NOTES
Patient a+o x 4, denies sob/lightheadedness/numbness/tingling/dizziness/chest pain/n/v/d. +periorbital edema to right eye, sclera pink, scant clear drainage. +blurry vision in both eyes per patient, [facility], afebrile, vss. Independent in room - steady gait. Tolerating diet. Fall precautions/hourly rounding maintained, call light within reach and functioning, all items within reach.  Patient encouraged to call for assistance, poc reviewed with patient, ?'s/concerns answered.

## 2018-07-05 NOTE — ASSESSMENT & PLAN NOTE
- Pt on Bicalutamide, has no symptoms of adverse effects  - Pt has gynecomastia (may be due to hyperestrogenism or obesity), no breast tenderness  - Pt has straining, no incomplete voiding, no dysuria, urgency, pt is on Tamsulosin  - No h/o dizziness, nausea, vomiting, diarrhea since being started on Tamsulosin  - No recent weight loss, fevers, bone/ back pain in recent past

## 2018-07-05 NOTE — PROGRESS NOTES
RN spoke with Dr. Mihaela Casarez in person in regards to patient Orbital cellulitis. RN received verbal orders from Dr. Casarez of [employer] opthalmic drops, 1 drop to right eye two times daily and Erythromycin ointment three times a day.  RN was able to order Cosopt and Erythromycin ointment medication for patient per doctor order, refer to MAR.

## 2018-07-05 NOTE — PROGRESS NOTES
"Report received from day RN. Assumed patient care at 1900. Patient is AAOx4. Patient appears calm and in no acute distress. Labs and orders reviewed. Assessment completed. Patient provided with scheduled medication, refer to MAR. Patient educated to be NPO at midnight per order, patient verbalized understanding. Plan of care discussed with patient; questions have been answered at this time. Patient needs have been met at this time. Patient educated to call when needing assistance. Call light within reach. Non-Skid socks in place. Bed locked and in the lowest position. Hourly rounding in place. /84   Pulse (!) 54   Temp 36.2 °C (97.1 °F)   Resp 18   Ht 1.702 m (5' 7\")   Wt 105.7 kg (233 lb 0.4 oz)   SpO2 97%   BMI 36.50 kg/m² .   "

## 2018-07-05 NOTE — PROGRESS NOTES
"Pharmacy Kinetics 68 y.o. male on vancomycin day # 2  7/5/2018    Currently Dose: Vancomycin 1200 mg iv q12hr (~11.5 mg/kg)  Received Load Dose: Unknown    Indication for Treatment: orbital cellulitis  ID Service Following: No (opthalmology consulted)    Pertinent history per medical record: Admitted on 7/4/2018 for orbital cellulitis from outside facility. Opthalmology consulted. Antimicrobials initiated.     Other antibiotics: piperacillin/tazobactam 4.5 gm iv q8h    Allergies: Patient has no known allergies.     List concerns for accumulation of vancomycin: BUN:SCr ~20:1, BMI ~37    Pertinent cultures to date:   Results     Procedure Component Value Units Date/Time    BLOOD CULTURE [[id number]] Collected:  07/04/18 1840    Order Status:  Completed Specimen:  Blood from Peripheral Updated:  07/05/18 0731     Significant Indicator NEG     Source BLD     Site PERIPHERAL     Blood Culture No Growth    Note: Blood cultures are incubated for 5 days and  are monitored continuously.Positive blood cultures  are called to the RN and reported as soon as  they are identified.      Narrative:       Per Hospital Policy: Only change Specimen Src: to \"Line\" if  specified by physician order.    BLOOD CULTURE [[id number]] Collected:  07/04/18 1840    Order Status:  Completed Specimen:  Blood from Peripheral Updated:  07/05/18 0731     Significant Indicator NEG     Source BLD     Site PERIPHERAL     Blood Culture No Growth    Note: Blood cultures are incubated for 5 days and  are monitored continuously.Positive blood cultures  are called to the RN and reported as soon as  they are identified.      Narrative:       Per Hospital Policy: Only change Specimen Src: to \"Line\" if  specified by physician order.        Recent Labs      07/05/18   0254   WBC  8.8   NEUTSPOLYS  69.90     Recent Labs      07/04/18   1847  07/05/18   0254   BUN  21  16   CREATININE  1.17  0.90   ALBUMIN   --   3.4     Recent Labs      07/05/18   1014 " "  ROYA  28.9*     Intake/Output Summary (Last 24 hours) at 18 1401  Last data filed at 18 0600   Gross per 24 hour   Intake              360 ml   Output                0 ml   Net              360 ml      Blood pressure 135/64, pulse (!) 51, temperature 36.3 °C (97.4 °F), resp. rate 19, height 1.702 m (5' 7\"), weight 105.7 kg (233 lb 0.4 oz), SpO2 94 %. Temp (24hrs), Av.1 °C (97 °F), Min:35.9 °C (96.6 °F), Max:36.3 °C (97.4 °F)    Estimated Creatinine Clearance: 92.3 mL/min (by C-G formula based on SCr of 0.9 mg/dL).    A/P   1. Vancomycin dose change: 1100 mg iv q24h (~10 mg/kg)   2. Next vancomycin level: 18 @1100  3. Goal trough: 12-16 mcg/mL  4. Comments: VS stable. Afebrile. WBC stable. CrCl ~92 mL/min and inaccurate due to body habitus. Microbiology pending (including OSH). Factors for accumulation identified. Most recent level noted and above goal (drawn prior to dose). Will dose adjust via linear kinetics for goal trough of ~13.5 mcg/mL. Likely to repeat trough in ~1-2 days. Pharmacy will follow and continue to adjust as appropriate.    Silviano Villa, PharmD  "

## 2018-07-06 LAB
ANION GAP SERPL CALC-SCNC: 11 MMOL/L (ref 0–11.9)
BASOPHILS # BLD AUTO: 0.7 % (ref 0–1.8)
BASOPHILS # BLD: 0.05 K/UL (ref 0–0.12)
BUN SERPL-MCNC: 16 MG/DL (ref 8–22)
CALCIUM SERPL-MCNC: 9.5 MG/DL (ref 8.5–10.5)
CHLORIDE SERPL-SCNC: 104 MMOL/L (ref 96–112)
CO2 SERPL-SCNC: 24 MMOL/L (ref 20–33)
CREAT SERPL-MCNC: 1.16 MG/DL (ref 0.5–1.4)
EOSINOPHIL # BLD AUTO: 0.28 K/UL (ref 0–0.51)
EOSINOPHIL NFR BLD: 3.8 % (ref 0–6.9)
ERYTHROCYTE [DISTWIDTH] IN BLOOD BY AUTOMATED COUNT: 49.1 FL (ref 35.9–50)
GLUCOSE SERPL-MCNC: 86 MG/DL (ref 65–99)
HCT VFR BLD AUTO: 40.4 % (ref 42–52)
HGB BLD-MCNC: 13.4 G/DL (ref 14–18)
IMM GRANULOCYTES # BLD AUTO: 0.03 K/UL (ref 0–0.11)
IMM GRANULOCYTES NFR BLD AUTO: 0.4 % (ref 0–0.9)
LYMPHOCYTES # BLD AUTO: 1.88 K/UL (ref 1–4.8)
LYMPHOCYTES NFR BLD: 25.8 % (ref 22–41)
MAGNESIUM SERPL-MCNC: 1.8 MG/DL (ref 1.5–2.5)
MCH RBC QN AUTO: 31.1 PG (ref 27–33)
MCHC RBC AUTO-ENTMCNC: 33.2 G/DL (ref 33.7–35.3)
MCV RBC AUTO: 93.7 FL (ref 81.4–97.8)
MONOCYTES # BLD AUTO: 0.74 K/UL (ref 0–0.85)
MONOCYTES NFR BLD AUTO: 10.1 % (ref 0–13.4)
NEUTROPHILS # BLD AUTO: 4.32 K/UL (ref 1.82–7.42)
NEUTROPHILS NFR BLD: 59.2 % (ref 44–72)
NRBC # BLD AUTO: 0 K/UL
NRBC BLD-RTO: 0 /100 WBC
PLATELET # BLD AUTO: 169 K/UL (ref 164–446)
PMV BLD AUTO: 10.8 FL (ref 9–12.9)
POTASSIUM SERPL-SCNC: 3.6 MMOL/L (ref 3.6–5.5)
RBC # BLD AUTO: 4.31 M/UL (ref 4.7–6.1)
SODIUM SERPL-SCNC: 139 MMOL/L (ref 135–145)
WBC # BLD AUTO: 7.3 K/UL (ref 4.8–10.8)

## 2018-07-06 PROCEDURE — A9270 NON-COVERED ITEM OR SERVICE: HCPCS | Performed by: INTERNAL MEDICINE

## 2018-07-06 PROCEDURE — 700101 HCHG RX REV CODE 250: Performed by: OPHTHALMOLOGY

## 2018-07-06 PROCEDURE — 700111 HCHG RX REV CODE 636 W/ 250 OVERRIDE (IP): Performed by: INTERNAL MEDICINE

## 2018-07-06 PROCEDURE — 770006 HCHG ROOM/CARE - MED/SURG/GYN SEMI*

## 2018-07-06 PROCEDURE — 700102 HCHG RX REV CODE 250 W/ 637 OVERRIDE(OP): Performed by: OPHTHALMOLOGY

## 2018-07-06 PROCEDURE — 700102 HCHG RX REV CODE 250 W/ 637 OVERRIDE(OP): Performed by: INTERNAL MEDICINE

## 2018-07-06 PROCEDURE — 700105 HCHG RX REV CODE 258: Performed by: INTERNAL MEDICINE

## 2018-07-06 PROCEDURE — 85025 COMPLETE CBC W/AUTO DIFF WBC: CPT

## 2018-07-06 PROCEDURE — 80048 BASIC METABOLIC PNL TOTAL CA: CPT

## 2018-07-06 PROCEDURE — A9270 NON-COVERED ITEM OR SERVICE: HCPCS | Performed by: OPHTHALMOLOGY

## 2018-07-06 PROCEDURE — 99233 SBSQ HOSP IP/OBS HIGH 50: CPT | Mod: GC | Performed by: INTERNAL MEDICINE

## 2018-07-06 PROCEDURE — 83735 ASSAY OF MAGNESIUM: CPT

## 2018-07-06 PROCEDURE — 36415 COLL VENOUS BLD VENIPUNCTURE: CPT

## 2018-07-06 RX ORDER — POTASSIUM CHLORIDE 20 MEQ/1
40 TABLET, EXTENDED RELEASE ORAL DAILY
Status: DISCONTINUED | OUTPATIENT
Start: 2018-07-06 | End: 2018-07-08

## 2018-07-06 RX ORDER — LORAZEPAM 2 MG/ML
0.5 INJECTION INTRAMUSCULAR EVERY 4 HOURS PRN
Status: DISCONTINUED | OUTPATIENT
Start: 2018-07-06 | End: 2018-07-06

## 2018-07-06 RX ORDER — LORAZEPAM 0.5 MG/1
0.5 TABLET ORAL EVERY 4 HOURS PRN
Status: DISCONTINUED | OUTPATIENT
Start: 2018-07-06 | End: 2018-07-06

## 2018-07-06 RX ORDER — LORAZEPAM 1 MG/1
2 TABLET ORAL
Status: DISCONTINUED | OUTPATIENT
Start: 2018-07-06 | End: 2018-07-06

## 2018-07-06 RX ORDER — LORAZEPAM 2 MG/ML
1.5 INJECTION INTRAMUSCULAR
Status: DISCONTINUED | OUTPATIENT
Start: 2018-07-06 | End: 2018-07-06

## 2018-07-06 RX ORDER — LORAZEPAM 2 MG/ML
1 INJECTION INTRAMUSCULAR
Status: DISCONTINUED | OUTPATIENT
Start: 2018-07-06 | End: 2018-07-06

## 2018-07-06 RX ORDER — LORAZEPAM 1 MG/1
3 TABLET ORAL
Status: DISCONTINUED | OUTPATIENT
Start: 2018-07-06 | End: 2018-07-06

## 2018-07-06 RX ORDER — LORAZEPAM 2 MG/ML
2 INJECTION INTRAMUSCULAR
Status: DISCONTINUED | OUTPATIENT
Start: 2018-07-06 | End: 2018-07-06

## 2018-07-06 RX ORDER — HALOPERIDOL 1 MG/1
1 TABLET ORAL EVERY 4 HOURS PRN
Status: DISCONTINUED | OUTPATIENT
Start: 2018-07-06 | End: 2018-07-08 | Stop reason: HOSPADM

## 2018-07-06 RX ORDER — LORAZEPAM 1 MG/1
4 TABLET ORAL
Status: DISCONTINUED | OUTPATIENT
Start: 2018-07-06 | End: 2018-07-06

## 2018-07-06 RX ORDER — LORAZEPAM 1 MG/1
1 TABLET ORAL EVERY 4 HOURS PRN
Status: DISCONTINUED | OUTPATIENT
Start: 2018-07-06 | End: 2018-07-06

## 2018-07-06 RX ADMIN — ERYTHROMYCIN: 5 OINTMENT OPHTHALMIC at 14:40

## 2018-07-06 RX ADMIN — CHLORTHALIDONE 12.5 MG: 25 TABLET ORAL at 09:27

## 2018-07-06 RX ADMIN — FAMOTIDINE 20 MG: 20 TABLET ORAL at 08:49

## 2018-07-06 RX ADMIN — DORZOLAMIDE HYDROCHLORIDE AND TIMOLOL MALEATE 1 DROP: 20; 5 SOLUTION/ DROPS OPHTHALMIC at 22:46

## 2018-07-06 RX ADMIN — CARVEDILOL 25 MG: 25 TABLET, FILM COATED ORAL at 08:49

## 2018-07-06 RX ADMIN — STANDARDIZED SENNA CONCENTRATE AND DOCUSATE SODIUM 2 TABLET: 8.6; 5 TABLET, FILM COATED ORAL at 08:49

## 2018-07-06 RX ADMIN — PIPERACILLIN AND TAZOBACTAM 4.5 G: 4; .5 INJECTION, POWDER, LYOPHILIZED, FOR SOLUTION INTRAVENOUS; PARENTERAL at 22:50

## 2018-07-06 RX ADMIN — TAMSULOSIN HYDROCHLORIDE 0.4 MG: 0.4 CAPSULE ORAL at 17:56

## 2018-07-06 RX ADMIN — HEPARIN SODIUM 5000 UNITS: 5000 INJECTION, SOLUTION INTRAVENOUS; SUBCUTANEOUS at 22:45

## 2018-07-06 RX ADMIN — VANCOMYCIN HYDROCHLORIDE 1100 MG: 100 INJECTION, POWDER, LYOPHILIZED, FOR SOLUTION INTRAVENOUS at 12:16

## 2018-07-06 RX ADMIN — ERYTHROMYCIN: 5 OINTMENT OPHTHALMIC at 22:46

## 2018-07-06 RX ADMIN — AMLODIPINE BESYLATE 5 MG: 5 TABLET ORAL at 08:49

## 2018-07-06 RX ADMIN — POTASSIUM CHLORIDE 40 MEQ: 1500 TABLET, EXTENDED RELEASE ORAL at 08:49

## 2018-07-06 RX ADMIN — DORZOLAMIDE HYDROCHLORIDE AND TIMOLOL MALEATE 1 DROP: 20; 5 SOLUTION/ DROPS OPHTHALMIC at 09:27

## 2018-07-06 RX ADMIN — VITAMIN D, TAB 1000IU (100/BT) 2000 UNITS: 25 TAB at 08:49

## 2018-07-06 RX ADMIN — ERYTHROMYCIN: 5 OINTMENT OPHTHALMIC at 09:28

## 2018-07-06 RX ADMIN — PIPERACILLIN AND TAZOBACTAM 4.5 G: 4; .5 INJECTION, POWDER, LYOPHILIZED, FOR SOLUTION INTRAVENOUS; PARENTERAL at 14:36

## 2018-07-06 ASSESSMENT — ENCOUNTER SYMPTOMS
PHOTOPHOBIA: 0
NAUSEA: 0
WEIGHT LOSS: 0
BACK PAIN: 0
WEAKNESS: 0
SPUTUM PRODUCTION: 0
SHORTNESS OF BREATH: 0
VOMITING: 0
CONSTIPATION: 0
MYALGIAS: 0
MEMORY LOSS: 0
DIARRHEA: 0
SINUS PAIN: 0
EYE DISCHARGE: 0
ABDOMINAL PAIN: 0
DOUBLE VISION: 0
EYE REDNESS: 1
PND: 0
HALLUCINATIONS: 0
EYE PAIN: 0
BLURRED VISION: 0
SORE THROAT: 0
NERVOUS/ANXIOUS: 0
FEVER: 0
ORTHOPNEA: 0
CHILLS: 0
HEADACHES: 0
PALPITATIONS: 0
COUGH: 0
SEIZURES: 0

## 2018-07-06 ASSESSMENT — LIFESTYLE VARIABLES
TOTAL SCORE: 6
HEADACHE, FULLNESS IN HEAD: NOT PRESENT
AUDITORY DISTURBANCES: NOT PRESENT
AGITATION: *
NAUSEA AND VOMITING: NO NAUSEA AND NO VOMITING
PAROXYSMAL SWEATS: NO SWEAT VISIBLE
NAUSEA AND VOMITING: NO NAUSEA AND NO VOMITING
TREMOR: NO TREMOR
AGITATION: *
TOTAL SCORE: 13
TREMOR: NO TREMOR
PAROXYSMAL SWEATS: NO SWEAT VISIBLE
SUBSTANCE_ABUSE: 0
ANXIETY: *
TREMOR: NO TREMOR
VISUAL DISTURBANCES: NOT PRESENT
ORIENTATION AND CLOUDING OF SENSORIUM: CANNOT DO SERIAL ADDITIONS OR IS UNCERTAIN ABOUT DATE
ORIENTATION AND CLOUDING OF SENSORIUM: CANNOT DO SERIAL ADDITIONS OR IS UNCERTAIN ABOUT DATE
ANXIETY: *
AUDITORY DISTURBANCES: NOT PRESENT
AUDITORY DISTURBANCES: NOT PRESENT
HEADACHE, FULLNESS IN HEAD: NOT PRESENT
VISUAL DISTURBANCES: NOT PRESENT
NAUSEA AND VOMITING: NO NAUSEA AND NO VOMITING
TOTAL SCORE: MODERATELY SEVERE HALLUCINATIONS
PAROXYSMAL SWEATS: NO SWEAT VISIBLE
TOTAL SCORE: 7
ORIENTATION AND CLOUDING OF SENSORIUM: CANNOT DO SERIAL ADDITIONS OR IS UNCERTAIN ABOUT DATE
VISUAL DISTURBANCES: NOT PRESENT
AGITATION: MODERATELY FIDGETY AND RESTLESS
HEADACHE, FULLNESS IN HEAD: NOT PRESENT
ANXIETY: MODERATELY ANXIOUS OR GUARDED, SO ANXIETY IS INFERRED

## 2018-07-06 ASSESSMENT — PAIN SCALES - GENERAL
PAINLEVEL_OUTOF10: 0

## 2018-07-06 NOTE — PROGRESS NOTES
"Report received from day RN. Assumed patient care at 1900. Patient is AAOx4. Patient noted to be anxious in regards to MRI screening, one-on-one discussion and reassurance provided to patient. Patient provided with Once PRN Ativan medication, prior to MRI, refer to MAR. Labs and orders reviewed. Assessment completed. Patient provided with scheduled medication, refer to MAR. Plan of care discussed with patient; questions have been answered at this time. Patient needs have been met at this time. Patient educated to call when needing assistance. Call light within reach. Non-Skid socks in place. Bed locked and in the lowest position. Hourly rounding in place. /65   Pulse 60   Temp 36.8 °C (98.2 °F)   Resp 16   Ht 1.702 m (5' 7\")   Wt 105.7 kg (233 lb 0.4 oz)   SpO2 94%   BMI 36.50 kg/m² .   "

## 2018-07-06 NOTE — CARE PLAN
Problem: Infection  Goal: Will remain free from infection    Intervention: Assess signs and symptoms of infection  Assessing and monitoring patient vital signs and lab values. Patient receiving IV Zosyn and Vancomycin ABX, refer to MAR. Using standard precautions, while providing care to patient.       Problem: Knowledge Deficit  Goal: Knowledge of disease process/condition, treatment plan, diagnostic tests, and medications will improve    Intervention: Assess knowledge level of disease process/condition, treatment plan, diagnostic tests, and medications  Plan of care discussed with patient, questions have been answered at this time. Patient encouraged to notify RN in regards to additional questions. Patient encouraged to voice feelings and concerns.

## 2018-07-06 NOTE — PROGRESS NOTES
"Pt is alert and oriented x3, confused to situation. Has delusions and hallucinations. Increased anxiety and pacing. Stating \"wanting to be free from this place.\" Pt placed in 527-2, so patient has to ambulate past nurses station if up. Brother at bedside to help redirect. WCTM.   "

## 2018-07-06 NOTE — PROGRESS NOTES
David from MRI called to notify, that patient is declining to continue the MRI procedure.     Patient back to room @2100 from MRI. Patient appears calm and in no acute distress. Patient notified RN of not being able to do the MRI procedure, patient provided with reassurance and emotional support. Patient educated to call when needing assistance, patient verbalizes understanding. Patient resting comfortably in bed. Call light within reach.     RN notified [facility] team Doctor Roberth in regards to patient not being able to do the MRI procedure @2224.

## 2018-07-06 NOTE — CONSULTS
"PSYCHIATRIC CONSULTATION:  Reason for admission: Right orbital cellulitis  Reason for consult: Paranoid delusions  Requesting Physician: Dr. Bob Jim    Legal status: Legal 2000 hold    Chief Complaint: \"Some things happened here in this hospital, but I don't want to get into specifics because I'm afraid it'll seem like I'm crazy.\"    HPI:     Mr. Blackmon is a 68 year old  [place]  with history of metastatic prostate cancer and alcohol use who [facility]. He was transferred to [facility] two days ago for ophthalmology evaluation and continued on conservative management with IV antibiotics. Last evening, he was noted by staff to exhibit paranoid and delusional symptoms, such as stating that the staff is part of a conspiracy theory to take away his medications, or that staff members are plotting against him in some way. He threatened to leave at that point, but he was placed on a legal 2000 hold.     On interview this morning, neri is seen at bedside along with his brother. He confirms that he did experience some things that seemed out of the ordinary last evening, but he declines to \"get into specifics\" because he is afraid that what he'll say will detain him even longer. He is able to say that he started feeling that something was wrong from the time that he was admitted to [facility]. He feels that the [facility] is [facility] a [facility], and he has not had this happen to him at any other hospital before. His brother lives next door to him and helps him with groceries and other household chores. He is also able to confirm that although \"he can walk away if he doesn't trust someone,\" this type of situation has never happened before to the . Neri understands that he is being treated for an eye infection and that he needs IV antibiotics. When asked what he would do if he left, he says that he might go home because he feels \"afraid,\" but then he " "would [facility] to [facility].    He says that his prostate cancer has spread to his bones mostly, and denies any brain mets. When asked if he has been using any substances recently, he also denied this. He does drink 1-2 pints of gin a week with last drink 5-6 days ago. He does say that he got Percocet for the first time last night for his pain, and wonders if it has been affecting him. He also says that the incidents that bothered him have occurred only at night, and that talking during the day he feels safe. In fact, as the interview went on, he began to doubt whether what he experienced at [facility] was \"real.\" It was explained to him that any combination of delirium, narcotics, or sundowning may have contributed to his experiences. He was able to understand this, although he still says that he felt that he didn't have a lot of trust in the staff members at [facility] as a result of his experiences. He would [facility], but is willing to consider staying to finish off his IV antibiotic course.    Psychiatric Review of Systems:  Depression:  denies  Verónica: denies  Anxiety/Panic Attacks : denies  PTSD symptom: denies  Psychosis: currently is not sure what he experienced is \"real\"      Medical Review of Systems: All systems reviewed. Only those found to be + are noted below. All others are negative.   Neurological:    TBIs: denies   SZs: denies   Strokes: denies    Psychiatric Examination:   Vitals: Weight/BMI: Body mass index is 36.5 kg/m². Blood pressure 158/72, pulse 65, temperature 36 °C (96.8 °F), resp. rate 20, height 1.702 m (5' 7\"), weight 105.7 kg (233 lb 0.4 oz), SpO2 96 %.   Vitals:    07/05/18 1535 07/05/18 1913 07/06/18 0334 07/06/18 0655   BP: 125/73 137/65 138/70 158/72   Pulse: 60 60 62 65   Resp: 18 16 17 20   Temp: 36.6 °C (97.9 °F) 36.8 °C (98.2 °F) 36.6 °C (97.9 °F) 36 °C (96.8 °F)   SpO2: 91% 94% 92% 96%   Weight:       Height:         Appearance: " "Obese  male dressed in street clothes, standing   Behavior: Open, cooperative, humorous at times  Thought Content: No SI/HI, no AVH or delusions noted currently  Thought Process: Linear and goal-oriented  Speech: Normal rate and rhythm  Mood: \"I think I'm ok\"  Affect: Euthymic         Attention/Alertness: Good  Orientation/Memory:  Good  Insight/Judgement: Fair/fair    Past Psychiatric Hx:   None      Social Hx:  Social History   Substance Use Topics   • Smoking status: Current Every Day Smoker     Packs/day: 1.00     Start date: 7/1/1969   • Smokeless tobacco: Never Used   • Alcohol use Yes      Comment: 2 drinks per night (gin)     Medical Hx:   Past Medical History:   Diagnosis Date   • Cellulitis of right orbital region 7/4/2018    Pt developed pain, redness and swelling in tissues around the right eye yesterday and came in to the ER at the [facility]. After workup patient has been transferred to [facility] to be treated by an Ophthalmologist.   • Chronic obstructive pulmonary disease (COPD) (HCC) 7/4/2018    Pt has longstanding COPD secondary to heavy tobacco use   • COPD (chronic obstructive pulmonary disease) ([facility])    • Hypertension    • Prostate cancer ([facility])        Allergies:   NKA    Medications:  No current facility-administered medications on file prior to encounter.      No current outpatient prescriptions on file prior to encounter.       Labs:  Lab Results   Component Value Date/Time    WBC 7.3 07/06/2018 03:22 AM    RBC 4.31 (L) 07/06/2018 03:22 AM    HEMOGLOBIN 13.4 (L) 07/06/2018 03:22 AM    HEMATOCRIT 40.4 (L) 07/06/2018 03:22 AM    MCV 93.7 07/06/2018 03:22 AM    MCH 31.1 07/06/2018 03:22 AM    MCHC 33.2 (L) 07/06/2018 03:22 AM    MPV 10.8 07/06/2018 03:22 AM    NEUTSPOLYS 59.20 07/06/2018 03:22 AM    LYMPHOCYTES 25.80 07/06/2018 03:22 AM    MONOCYTES 10.10 07/06/2018 03:22 AM    EOSINOPHILS 3.80 07/06/2018 03:22 AM    BASOPHILS 0.70 07/06/2018 03:22 AM      Lab Results   Component Value " Date/Time    SODIUM 139 07/06/2018 03:22 AM    POTASSIUM 3.6 07/06/2018 03:22 AM    CHLORIDE 104 07/06/2018 03:22 AM    CO2 24 07/06/2018 03:22 AM    GLUCOSE 86 07/06/2018 03:22 AM    BUN 16 07/06/2018 03:22 AM    CREATININE 1.16 07/06/2018 03:22 AM      Lab Results   Component Value Date/Time    ALTSGPT 12 07/05/2018 02:54 AM    ASTSGOT 17 07/05/2018 02:54 AM    ALKPHOSPHAT 89 07/05/2018 02:54 AM    TBILIRUBIN 0.7 07/05/2018 02:54 AM    ALBUMIN 3.4 07/05/2018 02:54 AM    GLOBULIN 2.9 07/05/2018 02:54 AM    INR 1.12 07/05/2018 02:54 AM     Lab Results   Component Value Date/Time    PROTHROMBTM 14.1 07/05/2018 02:54 AM    INR 1.12 07/05/2018 02:54 AM      Cranial Imaging: pending    ASSESSMENT:   Mr. Blackmon is a 68 year old male admitted with right orbital cellulitis being treated with IV antibiotics, on a legal 2000 hold for attempting to leave the hospital in the context of paranoid delusions and concern for psychosis. Based on interview today and collateral per brother, it does not appear that his periods of altered mental status are due to a primary psychotic disorder. Especially because these episodes occurred at night, it is likely a combination of delirium secondary to underlying infection, or narcotic-induced altered mentation, or sundowning given his age. He is able to understand his current medical situation as well as appreciate the risks of leaving the hospital AMA.    PLAN:  Legal status: discontinue legal 2000 hold    - Recommend switching Percocet to non-narcotic pain control if possible  -  [facility], as he feels safe and familiar with that hospital. He understands the importance of continuing active treatment for his infection.      Psychiatry will sign off.  Thank you for the consult.

## 2018-07-06 NOTE — CARE PLAN
Problem: Safety  Goal: Will remain free from falls  Outcome: PROGRESSING AS EXPECTED  Bed in low, bed alarm in place, call light in reach, and hourly rounding      Problem: Psychosocial Needs:  Goal: Level of anxiety will decrease  Outcome: PROGRESSING SLOWER THAN EXPECTED  Reoriented pt, placed on ciwa, family at bedside.

## 2018-07-06 NOTE — PROGRESS NOTES
"RN noted patient bleeding from IV site, patient disconnected IV tubing from IV site. Patient states \"I don't know what going on.\" Patient educated on the situation. Patient states \" I don't need this IV.\" Patient educated on the importance of having PIV in order to receive ABX treatment. Patient noted to be confused, one-on-one discussion and reassurance provided. Patient educated to call when needing assistance. Bed alarm placed on for patient safety.   "

## 2018-07-06 NOTE — PROGRESS NOTES
"Pharmacy Kinetics 68 y.o. male on vancomycin day # 3  7/6/2018    Currently Dose: Vancomycin 1100 mg iv q24hr (~10 mg/kg  Received Load Dose: unknown    Indication for Treatment: orbital cellulitis  ID Service Following: No (opthalmology consulted)    Pertinent history per medical record: Admitted on 7/4/2018 for orbital cellulitis from outside facility. Opthalmology consulted. Antimicrobials initiated.     Other antibiotics: piperacillin/tazobactam 4.5 gm iv q8h     Allergies: Patient has no known allergies.     List concerns for accumulation of vancomycin: BUN:SCr ~20:1, BMI ~37     Pertinent cultures to date:   Results     Procedure Component Value Units Date/Time    BLOOD CULTURE [[id number]] Collected:  07/04/18 1840    Order Status:  Completed Specimen:  Blood from Peripheral Updated:  07/05/18 0731     Significant Indicator NEG     Source BLD     Site PERIPHERAL     Blood Culture No Growth    Note: Blood cultures are incubated for 5 days and  are monitored continuously.Positive blood cultures  are called to the RN and reported as soon as  they are identified.      Narrative:       Per Hospital Policy: Only change Specimen Src: to \"Line\" if  specified by physician order.    BLOOD CULTURE [[id number]] Collected:  07/04/18 1840    Order Status:  Completed Specimen:  Blood from Peripheral Updated:  07/05/18 0731     Significant Indicator NEG     Source BLD     Site PERIPHERAL     Blood Culture No Growth    Note: Blood cultures are incubated for 5 days and  are monitored continuously.Positive blood cultures  are called to the RN and reported as soon as  they are identified.      Narrative:       Per Hospital Policy: Only change Specimen Src: to \"Line\" if  specified by physician order.        Recent Labs      07/05/18   0254  07/06/18   0322   WBC  8.8  7.3   NEUTSPOLYS  69.90  59.20     Recent Labs      07/04/18   1847  07/05/18   0254  07/06/18   0322   BUN  21  16  16   CREATININE  1.17  0.90  1.16   ALBUMIN   --  " " 3.4   --      Recent Labs      18   1014   ROYA  28.9*     Blood pressure 138/70, pulse 62, temperature 36.6 °C (97.9 °F), resp. rate 17, height 1.702 m (5' 7\"), weight 105.7 kg (233 lb 0.4 oz), SpO2 92 %. Temp (24hrs), Av.7 °C (98 °F), Min:36.6 °C (97.9 °F), Max:36.8 °C (98.2 °F)    Estimated Creatinine Clearance: 71.6 mL/min (by C-G formula based on SCr of 1.16 mg/dL).    A/P   1. Vancomycin dose change: not indicated   2. Next vancomycin level: 18 @1100  3. Goal trough: 12-16 mcg/mL  4. Comments: VS stable. Afebrile. WBC stable. Microbiology pending. OSH culture results pending. CrCl ~72 mL/min (SCr worsened). Factors for accumulation noted. Prior level noted. Trough in place 18 to ensure clearance. Pharmacy will follow and continue to adjust as appropriate.    Silviano Villa, PharmD  "

## 2018-07-06 NOTE — PROGRESS NOTES
"RN in room to provide patient scheduled medication. Patient stated to RN not being able to find glasses, RN assisted patient in finding glasses, RN noticed patient having home medication in bag. RN educated patient of not having home medication at bedside and needing to take medication down to pharmacy. Patient states \"This is a conspiracy against me you cannot take this medication I know I won't be getting these medication back.\" RN educated patient that medication need to be taken down to pharmacy for patient safety, and medication will be given back to patient once patient is being discharged. Patient states \"I'm not taking any of the medication that you have brought.\" Patient educated on the importance of taking scheduled medication, patient refuses. Patient noted to be hallucinating and having Delusions. Patient states \"I'm leaving from here.\" Patient educated that it is unsafe to leave from the hospital at this time and it is important for the patient to receive there ABX treatment. Patient continuous to refuse and continuous to state they want to leave.RN notified charge nurse of the situation. RN stat paged [facility] yellow Dr. Salas and notified Dr. Salas of patient situation and trying to leave AMA @0635, Dr. Salas stated they will come and see patient. Dr. Salas and Dr. Segura came to assess patient @0640. RN notified on-coming shift Nurse Hai of situation. Per Dr. Segura patient to be on a legal hold at this time.   "

## 2018-07-06 NOTE — PROGRESS NOTES
Internal Medicine Interval Note  Note Author: Ino Salas M.D.     Name Feliz Blackmon     1950   Age/Sex 68 y.o. male   MRN [id number]   Code Status DNAR/DNI     After 5PM or if no immediate response to page, please call for cross-coverage  Attending/Team: Dr Jim/ Tariq See Patient List for primary contact information  Call [phone] to page    1st Call - Day Intern (R1):   Dr Salas 2nd Call - Day Sr. Resident (R2/R3):   Dr Hogan         Reason for interval visit  (Principal Problem)   Orbital cellulitis  Delirium      Interval Problem Daily Status Update  (24 hours, problem oriented, brief subjective history, new lab/imaging data pertinent to that problem)   Orbital cellulitis: patient has vitals and WBC count in normal range, does not exhibit signs of intracranial progression of cellulitis.   Delirium: This morning pt was under the impression that a tripwire had been placed in his room and there was a conspiracy to keep him in the hospital against his will, and was prepared to leave the hospital against medical advice. Pt was counseled along with his brother (also his caretaker) to no avail and was thus placed under legal hold pending a Psychiatry evaluation. According to Psychiatry symptoms were due to delirium secondary to an organic cause or possibly opioid medication, and he was removed from legal hold. Pt is currently in hospital and is co-operative with medical management.       Review of Systems   Constitutional: Negative for chills, fever and weight loss.   HENT: Negative for congestion, ear discharge, ear pain, hearing loss, nosebleeds, sinus pain, sore throat and tinnitus.    Eyes: Positive for redness. Negative for blurred vision, double vision, photophobia, pain and discharge.   Respiratory: Negative for cough, sputum production and shortness of breath.    Cardiovascular: Negative for chest pain, palpitations, orthopnea and PND.   Gastrointestinal: Negative for abdominal  pain, constipation, diarrhea, nausea and vomiting.   Genitourinary: Negative for dysuria, frequency and urgency.   Musculoskeletal: Negative for back pain, joint pain and myalgias.   Skin: Negative for itching and rash.   Neurological: Negative for seizures, weakness and headaches.   Endo/Heme/Allergies: Negative for environmental allergies.   Psychiatric/Behavioral: Negative for hallucinations, memory loss and substance abuse. The patient is not nervous/anxious.        Disposition/Barriers to discharge:   Resolution of orbital cellulitis    Consultants/Specialty  Dr Mihaela Casarez MD, Ophthalmology  Dr Tana Christensen MD, Psychiatry resident  PCP: No PCP on file      Quality Measures  Quality-Core Measures   Reviewed items::  Labs reviewed and Medications reviewed  Kirkpatrick catheter::  No Kirkpatrick  DVT prophylaxis - mechanical:  SCDs  Ulcer Prophylaxis::  Not indicated          Physical Exam       Vitals:    07/05/18 1913 07/06/18 0334 07/06/18 0655 07/06/18 1152   BP: 137/65 138/70 158/72 137/69   Pulse: 60 62 65 (!) 57   Resp: 16 17 20 20   Temp: 36.8 °C (98.2 °F) 36.6 °C (97.9 °F) 36 °C (96.8 °F) 36.8 °C (98.2 °F)   SpO2: 94% 92% 96% 93%   Weight:       Height:         Body mass index is 36.5 kg/m².    Oxygen Therapy:  Pulse Oximetry: 93 %, O2 (LPM): 0, O2 Delivery: None (Room Air)    Physical Exam   Constitutional: He is oriented to person, place, and time and well-developed, well-nourished, and in no distress. No distress.   HENT:   Head: Normocephalic and atraumatic.   Right Ear: External ear normal.   Left Ear: External ear normal.   Nose: Nose normal.   Mouth/Throat: No oropharyngeal exudate.   Eyes: Conjunctivae and EOM are normal. No scleral icterus.   Neck: Normal range of motion. Neck supple.   Cardiovascular: Normal rate, regular rhythm and normal heart sounds.    No murmur heard.  Pulmonary/Chest: Breath sounds normal. No respiratory distress. He has no rales.   Abdominal: Soft. Bowel sounds are normal. He  exhibits no distension. There is no tenderness.   Musculoskeletal: Normal range of motion. He exhibits no tenderness or deformity.   Lymphadenopathy:     He has no cervical adenopathy.   Neurological: He is alert and oriented to person, place, and time. He exhibits normal muscle tone.   Cranial nerves normal, no focal signs   Skin: Skin is warm and dry. No rash noted. He is not diaphoretic. No erythema.   Psychiatric: Memory normal.   Pt had delusions of persecution and conspiracy, does not presently exhibit these symptoms             Assessment/Plan     * Cellulitis of right orbital region- (present on admission)   Assessment & Plan    - [facility] and extensive paranasal sinus involvement  - Pt refused to undergo MRI due to claustrophobia yesterday, has agreed to MRI today under anesthesia  - No external ophthalmoplegia, EOMs painless  - Inflammation around eye present, no conjunctival injection  - Pt has b/l miosis unreactive to light or accomodation, has had so documented since [facility]  - Pt does not give h/o opiate use/ prior stroke  - Pt on IV antibiotics Vancomycin and Zosyn, to follow with Ophthal  - Dorzolamide-Timolol eyedrops and Erythromycin topical to continue as per Ophthalmology  - Temperatures and WBC count trending in normal range  - Conservative management in absence of extension of infection beyond preseptal compartment  - Pt wanted to leave the hospital AMA this morning after experiencing delusions of persecution, discussion was had with his legal caretaker, placed under legal hold, pt has since been evaluated by Psychiatry  - Legal hold terminated as pt probably does not have primary psychiatric disorder, probably secondary to organic delirium.  - Pt is still in hospital as of now and is co-operative with medical therapy and admits he may have been overreacting during episode of delusions in the morning.        Chronic obstructive pulmonary disease  (COPD) ([facility])   Assessment & Plan    - Cough present with mild expiratory wheeze, non productive  - Pt counseled about smoking cessation and impact on mortality, has refused smoking cessation therapy  - Pt on PRN Albuterol inhaled  - Pt to be advised CT chest (as per USPSTF guidelines) on outpatient basis        Prostate cancer ([facility])- (present on admission)   Assessment & Plan    - Pt on Bicalutamide, has no symptoms of adverse effects  - Pt has gynecomastia  - Pt has straining, is on Tamsulosin  - No h/o dizziness, nausea, vomiting, diarrhea since being started on Tamsulosin  - No recent weight loss, fevers, bone/ back pain in recent past        Hypertension- (present on admission)   Assessment & Plan    - Pt on Chlorthalidone, Norvasc, Coreg.  - BP trending in the normal range.  - HR trending in lower borderline of normal range  - Pt advised about cardiovascular benefits of smoking cessation.

## 2018-07-06 NOTE — PROCEDURES
Patient arrived to MRI premedicated.  Once on table and head coil in place, patient declined to continue.  Patient offered blindfold, panic ball, etc.  Patient continues to refuse exam.   RN aware

## 2018-07-07 PROBLEM — N17.9 AKI (ACUTE KIDNEY INJURY) (HCC): Status: ACTIVE | Noted: 2018-07-07

## 2018-07-07 LAB
ANION GAP SERPL CALC-SCNC: 10 MMOL/L (ref 0–11.9)
BUN SERPL-MCNC: 16 MG/DL (ref 8–22)
CALCIUM SERPL-MCNC: 9.9 MG/DL (ref 8.5–10.5)
CHLORIDE SERPL-SCNC: 104 MMOL/L (ref 96–112)
CO2 SERPL-SCNC: 25 MMOL/L (ref 20–33)
CREAT SERPL-MCNC: 1.52 MG/DL (ref 0.5–1.4)
GLUCOSE SERPL-MCNC: 94 MG/DL (ref 65–99)
POTASSIUM SERPL-SCNC: 3.6 MMOL/L (ref 3.6–5.5)
SCCMEC + MECA PNL NOSE NAA+PROBE: NEGATIVE
SCCMEC + MECA PNL NOSE NAA+PROBE: NEGATIVE
SODIUM SERPL-SCNC: 139 MMOL/L (ref 135–145)
VANCOMYCIN TROUGH SERPL-MCNC: 17.1 UG/ML (ref 10–20)

## 2018-07-07 PROCEDURE — 80048 BASIC METABOLIC PNL TOTAL CA: CPT

## 2018-07-07 PROCEDURE — 700102 HCHG RX REV CODE 250 W/ 637 OVERRIDE(OP): Performed by: INTERNAL MEDICINE

## 2018-07-07 PROCEDURE — 87641 MR-STAPH DNA AMP PROBE: CPT

## 2018-07-07 PROCEDURE — A9270 NON-COVERED ITEM OR SERVICE: HCPCS | Performed by: INTERNAL MEDICINE

## 2018-07-07 PROCEDURE — 87640 STAPH A DNA AMP PROBE: CPT

## 2018-07-07 PROCEDURE — 700105 HCHG RX REV CODE 258: Performed by: INTERNAL MEDICINE

## 2018-07-07 PROCEDURE — 99232 SBSQ HOSP IP/OBS MODERATE 35: CPT | Mod: GC | Performed by: INTERNAL MEDICINE

## 2018-07-07 PROCEDURE — 80202 ASSAY OF VANCOMYCIN: CPT

## 2018-07-07 PROCEDURE — 36415 COLL VENOUS BLD VENIPUNCTURE: CPT

## 2018-07-07 PROCEDURE — 770006 HCHG ROOM/CARE - MED/SURG/GYN SEMI*

## 2018-07-07 PROCEDURE — 700111 HCHG RX REV CODE 636 W/ 250 OVERRIDE (IP): Performed by: INTERNAL MEDICINE

## 2018-07-07 RX ORDER — LINEZOLID 600 MG/1
600 TABLET, FILM COATED ORAL EVERY 12 HOURS
Status: DISCONTINUED | OUTPATIENT
Start: 2018-07-07 | End: 2018-07-08 | Stop reason: HOSPADM

## 2018-07-07 RX ADMIN — ERYTHROMYCIN: 5 OINTMENT OPHTHALMIC at 13:56

## 2018-07-07 RX ADMIN — FAMOTIDINE 20 MG: 20 TABLET ORAL at 09:01

## 2018-07-07 RX ADMIN — HEPARIN SODIUM 5000 UNITS: 5000 INJECTION, SOLUTION INTRAVENOUS; SUBCUTANEOUS at 05:54

## 2018-07-07 RX ADMIN — LINEZOLID 600 MG: 600 TABLET, FILM COATED ORAL at 21:27

## 2018-07-07 RX ADMIN — STANDARDIZED SENNA CONCENTRATE AND DOCUSATE SODIUM 2 TABLET: 8.6; 5 TABLET, FILM COATED ORAL at 21:28

## 2018-07-07 RX ADMIN — ERYTHROMYCIN: 5 OINTMENT OPHTHALMIC at 09:02

## 2018-07-07 RX ADMIN — POTASSIUM CHLORIDE 40 MEQ: 1500 TABLET, EXTENDED RELEASE ORAL at 09:01

## 2018-07-07 RX ADMIN — PIPERACILLIN AND TAZOBACTAM 4.5 G: 4; .5 INJECTION, POWDER, LYOPHILIZED, FOR SOLUTION INTRAVENOUS; PARENTERAL at 21:28

## 2018-07-07 RX ADMIN — DORZOLAMIDE HYDROCHLORIDE AND TIMOLOL MALEATE 1 DROP: 20; 5 SOLUTION/ DROPS OPHTHALMIC at 09:02

## 2018-07-07 RX ADMIN — NICOTINE 21 MG: 21 PATCH, EXTENDED RELEASE TRANSDERMAL at 05:55

## 2018-07-07 RX ADMIN — TAMSULOSIN HYDROCHLORIDE 0.4 MG: 0.4 CAPSULE ORAL at 17:25

## 2018-07-07 RX ADMIN — HEPARIN SODIUM 5000 UNITS: 5000 INJECTION, SOLUTION INTRAVENOUS; SUBCUTANEOUS at 13:56

## 2018-07-07 RX ADMIN — PIPERACILLIN AND TAZOBACTAM 4.5 G: 4; .5 INJECTION, POWDER, LYOPHILIZED, FOR SOLUTION INTRAVENOUS; PARENTERAL at 13:56

## 2018-07-07 RX ADMIN — CHLORTHALIDONE 12.5 MG: 25 TABLET ORAL at 09:01

## 2018-07-07 RX ADMIN — DORZOLAMIDE HYDROCHLORIDE AND TIMOLOL MALEATE 1 DROP: 20; 5 SOLUTION/ DROPS OPHTHALMIC at 21:31

## 2018-07-07 RX ADMIN — AMLODIPINE BESYLATE 5 MG: 5 TABLET ORAL at 09:01

## 2018-07-07 RX ADMIN — LINEZOLID 600 MG: 600 TABLET, FILM COATED ORAL at 13:56

## 2018-07-07 RX ADMIN — VITAMIN D, TAB 1000IU (100/BT) 2000 UNITS: 25 TAB at 09:01

## 2018-07-07 RX ADMIN — ERYTHROMYCIN: 5 OINTMENT OPHTHALMIC at 21:28

## 2018-07-07 RX ADMIN — ACETAMINOPHEN 650 MG: 325 TABLET, FILM COATED ORAL at 21:31

## 2018-07-07 RX ADMIN — HEPARIN SODIUM 5000 UNITS: 5000 INJECTION, SOLUTION INTRAVENOUS; SUBCUTANEOUS at 21:28

## 2018-07-07 RX ADMIN — PIPERACILLIN AND TAZOBACTAM 4.5 G: 4; .5 INJECTION, POWDER, LYOPHILIZED, FOR SOLUTION INTRAVENOUS; PARENTERAL at 05:55

## 2018-07-07 ASSESSMENT — PAIN SCALES - GENERAL
PAINLEVEL_OUTOF10: 0

## 2018-07-07 NOTE — PROGRESS NOTES
Bedside change of shift report received from night shift nurse. Patient sleeping comfortably in bed in no acute distress. Call light within reach.

## 2018-07-07 NOTE — PROGRESS NOTES
Internal Medicine Interval Note  Note Author: Ino Salas M.D.     Name Feliz Blackmon     1950   Age/Sex 68 y.o. male   MRN [id number]   Code Status DNAR/DNI     After 5PM or if no immediate response to page, please call for cross-coverage  Attending/Team: Dr Jim/ Tariq See Patient List for primary contact information  Call [phone] to page    1st Call - Day Intern (R1):   Dr Salas 2nd Call - Day Sr. Resident (R2/R3):   Dr Hogan         Reason for interval visit  (Principal Problem)   Orbital/ preseptal cellulitis  Delirium  Acute kidney Injury    Interval Problem Daily Status Update  (24 hours, problem oriented, brief subjective history, new lab/imaging data pertinent to that problem)   Orbital cellulitis: Clinically improved, EOMs/ visual field intact, no chemosis, IV antibiotics to continue until discharge.  Delirium: Pt is conscious, alert, oriented to person, place and time, has no residual symptoms from yesterday, viz delusions of persecution and conspiracy.  LALITHA: GFR today was 46, attributed to possible Vancomycin toxicity. Pt switched over to IV Linezolid. To monitor renal function in AM.      ROS    Disposition/Barriers to discharge:   Resolution of LALITHA and consult with Ophthalmology as to patient's fitness for discharge    Consultants/Specialty  Dr Mihaela Casarez MD, Ophthalmology  Dr Tana Christensen MD, Psychiatry resident  PCP: No PCP on file      Quality Measures  Quality-Core Measures        Physical Exam       Vitals:    18 0330 18 0400 18 0710 18 0900   BP: 150/56  136/57    Pulse: (!) 51  (!) 51 63   Resp: 17 16 16    Temp: 36.3 °C (97.4 °F)  36.7 °C (98.1 °F)    SpO2: 94%  95%    Weight:       Height:         Body mass index is 36.5 kg/m².    Oxygen Therapy:  Pulse Oximetry: 95 %, O2 (LPM): 0, O2 Delivery: None (Room Air)    Physical Exam          Assessment/Plan     * Cellulitis of right orbital region- (present on admission)   Assessment & Plan    - [facility] shows [facility] involvement and extensive paranasal sinus involvement  - Pt has refused to undergo MRI due to claustrophobia   - No external ophthalmoplegia, EOMs painless  - Inflammation around eye has significantly diminished today, no conjunctival injection  - Pt has b/l miosis unreactive to light or accomodation, has had so documented since being [facility]  - Pt does not give h/o opiate use/ prior stroke  - Pt switched over to Zosyn and IV Linezolid post discussion with Pharmacology in view of possible renal toxicity of Vancomycin  - To follow with Ophthal for reevaluation  - Dorzolamide-Timolol eyedrops and Erythromycin topical to continue as per Ophthalmology  - Temperatures and WBC count trending in normal range  - Conservative management in absence of extension of infection beyond preseptal compartment  - Pt wanted to leave the hospital AMA this morning after experiencing delusions of persecution, discussion was had with his legal caretaker, placed under legal hold, pt has since been evaluated by Psychiatry  - Legal hold terminated as pt probably does not have primary psychiatric disorder, probably secondary to organic delirium.  - Pt is still in hospital as of now and is co-operative with medical therapy and admits he may have been overreacting during episode of   delusions in the morning.        Chronic obstructive pulmonary disease (COPD) ([facility])   Assessment & Plan    - Cough present with mild expiratory wheeze, non productive  - No dyspnea at rest  - Pt counseled about smoking cessation and impact on mortality, has refused smoking cessation therapy  - Pt on PRN Albuterol inhaled  - Pt to be advised CT chest (as per USPSTF guidelines) on outpatient basis        LALITHA (acute kidney injury) ([facility])   Assessment & Plan    GFR 46 noted today, pt switched over from Vancomycin to IV Linezolid  KCl 20 mEq OD started   Labs in AM        Prostate cancer ([facility])- (present on admission)   Assessment &  Plan    - Pt on Bicalutamide, has no symptoms of adverse effects  - Pt has gynecomastia  - Pt has straining, is on Tamsulosin  - No h/o dizziness, nausea, vomiting, diarrhea since being started on Tamsulosin  - No recent weight loss, fevers, bone/ back pain in recent past        Hypertension- (present on admission)   Assessment & Plan    - Pt on Chlorthalidone, Norvasc, Coreg.  - BP trending in the normal range.  - HR has been trending in normal range  - Pt advised about cardiovascular benefits of smoking cessation.

## 2018-07-07 NOTE — CARE PLAN
Problem: Communication  Goal: The ability to communicate needs accurately and effectively will improve  Outcome: PROGRESSING AS EXPECTED  Patient verbalizes needs appropriately to staff.    Problem: Safety  Goal: Will remain free from injury  Outcome: PROGRESSING AS EXPECTED  Patient utilizes call light when needing assistance.

## 2018-07-07 NOTE — ASSESSMENT & PLAN NOTE
GFR 53 noted today, improved from yesterday  Vancomycin has been discontinued  Pt to be discharged on Levofloxacin PO

## 2018-07-07 NOTE — PROGRESS NOTES
Report received from ANGELIKA Valles. Assumed care of patient at 1845. Fall precautions in place. Will continue to monitor.

## 2018-07-08 VITALS
HEART RATE: 55 BPM | OXYGEN SATURATION: 93 % | WEIGHT: 228.18 LBS | HEIGHT: 67 IN | SYSTOLIC BLOOD PRESSURE: 121 MMHG | BODY MASS INDEX: 35.81 KG/M2 | RESPIRATION RATE: 17 BRPM | TEMPERATURE: 96.5 F | DIASTOLIC BLOOD PRESSURE: 69 MMHG

## 2018-07-08 LAB
ANION GAP SERPL CALC-SCNC: 10 MMOL/L (ref 0–11.9)
BUN SERPL-MCNC: 16 MG/DL (ref 8–22)
CALCIUM SERPL-MCNC: 9.8 MG/DL (ref 8.5–10.5)
CHLORIDE SERPL-SCNC: 105 MMOL/L (ref 96–112)
CO2 SERPL-SCNC: 26 MMOL/L (ref 20–33)
CREAT SERPL-MCNC: 1.34 MG/DL (ref 0.5–1.4)
EKG IMPRESSION: NORMAL
GLUCOSE SERPL-MCNC: 100 MG/DL (ref 65–99)
POTASSIUM SERPL-SCNC: 3.2 MMOL/L (ref 3.6–5.5)
SODIUM SERPL-SCNC: 141 MMOL/L (ref 135–145)

## 2018-07-08 PROCEDURE — 700111 HCHG RX REV CODE 636 W/ 250 OVERRIDE (IP): Performed by: INTERNAL MEDICINE

## 2018-07-08 PROCEDURE — 93005 ELECTROCARDIOGRAM TRACING: CPT | Performed by: STUDENT IN AN ORGANIZED HEALTH CARE EDUCATION/TRAINING PROGRAM

## 2018-07-08 PROCEDURE — 93010 ELECTROCARDIOGRAM REPORT: CPT | Performed by: INTERNAL MEDICINE

## 2018-07-08 PROCEDURE — 36415 COLL VENOUS BLD VENIPUNCTURE: CPT

## 2018-07-08 PROCEDURE — 99239 HOSP IP/OBS DSCHRG MGMT >30: CPT | Mod: GC | Performed by: INTERNAL MEDICINE

## 2018-07-08 PROCEDURE — 80048 BASIC METABOLIC PNL TOTAL CA: CPT

## 2018-07-08 PROCEDURE — 700102 HCHG RX REV CODE 250 W/ 637 OVERRIDE(OP): Performed by: INTERNAL MEDICINE

## 2018-07-08 PROCEDURE — 700105 HCHG RX REV CODE 258: Performed by: INTERNAL MEDICINE

## 2018-07-08 PROCEDURE — A9270 NON-COVERED ITEM OR SERVICE: HCPCS | Performed by: INTERNAL MEDICINE

## 2018-07-08 RX ORDER — POTASSIUM CHLORIDE 20 MEQ/1
40 TABLET, EXTENDED RELEASE ORAL 2 TIMES DAILY
Status: DISCONTINUED | OUTPATIENT
Start: 2018-07-08 | End: 2018-07-08 | Stop reason: HOSPADM

## 2018-07-08 RX ORDER — DORZOLAMIDE HYDROCHLORIDE AND TIMOLOL MALEATE 20; 5 MG/ML; MG/ML
1 SOLUTION/ DROPS OPHTHALMIC 2 TIMES DAILY
Qty: 1 BOTTLE | Refills: 0 | Status: SHIPPED | OUTPATIENT
Start: 2018-07-08

## 2018-07-08 RX ORDER — LEVOFLOXACIN 750 MG/1
750 TABLET, FILM COATED ORAL DAILY
Qty: 10 TAB | Refills: 0 | Status: SHIPPED | OUTPATIENT
Start: 2018-07-08

## 2018-07-08 RX ORDER — ERYTHROMYCIN 5 MG/G
1 OINTMENT OPHTHALMIC 3 TIMES DAILY
Qty: 1 TUBE | Refills: 1 | Status: SHIPPED | OUTPATIENT
Start: 2018-07-08

## 2018-07-08 RX ADMIN — PIPERACILLIN AND TAZOBACTAM 4.5 G: 4; .5 INJECTION, POWDER, LYOPHILIZED, FOR SOLUTION INTRAVENOUS; PARENTERAL at 05:19

## 2018-07-08 RX ADMIN — POTASSIUM CHLORIDE 40 MEQ: 1500 TABLET, EXTENDED RELEASE ORAL at 08:33

## 2018-07-08 RX ADMIN — VITAMIN D, TAB 1000IU (100/BT) 2000 UNITS: 25 TAB at 08:33

## 2018-07-08 RX ADMIN — AMLODIPINE BESYLATE 5 MG: 5 TABLET ORAL at 08:34

## 2018-07-08 RX ADMIN — DORZOLAMIDE HYDROCHLORIDE AND TIMOLOL MALEATE 1 DROP: 20; 5 SOLUTION/ DROPS OPHTHALMIC at 08:34

## 2018-07-08 RX ADMIN — CHLORTHALIDONE 12.5 MG: 25 TABLET ORAL at 10:28

## 2018-07-08 RX ADMIN — ERYTHROMYCIN: 5 OINTMENT OPHTHALMIC at 08:33

## 2018-07-08 RX ADMIN — ERYTHROMYCIN: 5 OINTMENT OPHTHALMIC at 14:10

## 2018-07-08 RX ADMIN — FAMOTIDINE 20 MG: 20 TABLET ORAL at 08:34

## 2018-07-08 RX ADMIN — LINEZOLID 600 MG: 600 TABLET, FILM COATED ORAL at 08:33

## 2018-07-08 RX ADMIN — NICOTINE 21 MG: 21 PATCH, EXTENDED RELEASE TRANSDERMAL at 05:19

## 2018-07-08 RX ADMIN — HEPARIN SODIUM 5000 UNITS: 5000 INJECTION, SOLUTION INTRAVENOUS; SUBCUTANEOUS at 05:19

## 2018-07-08 ASSESSMENT — ENCOUNTER SYMPTOMS
CHILLS: 0
NERVOUS/ANXIOUS: 0
PALPITATIONS: 0
NECK PAIN: 0
WEAKNESS: 0
SEIZURES: 0
DIARRHEA: 0
SENSORY CHANGE: 0
SHORTNESS OF BREATH: 0
ABDOMINAL PAIN: 0
HEADACHES: 0
SPUTUM PRODUCTION: 0
BLURRED VISION: 0
BACK PAIN: 0
DOUBLE VISION: 0
INSOMNIA: 0
HALLUCINATIONS: 0
SORE THROAT: 0
SINUS PAIN: 0
FOCAL WEAKNESS: 0
COUGH: 0
FEVER: 0
SPEECH CHANGE: 0
VOMITING: 0
CONSTIPATION: 0

## 2018-07-08 ASSESSMENT — PAIN SCALES - GENERAL: PAINLEVEL_OUTOF10: 0

## 2018-07-08 ASSESSMENT — LIFESTYLE VARIABLES: SUBSTANCE_ABUSE: 1

## 2018-07-08 NOTE — PROGRESS NOTES
Bedside change of shift report received from night shift nurse.Patient sitting at edge of bed speaking with doctor. Call light within reach.

## 2018-07-08 NOTE — NON-PROVIDER
Internal Medicine Medical Student Note  Note Author: Doris Grace, Student    Name Feliz Blackmon     1950   Age/Sex 68 y.o. male   MRN [id number]   Code Status DNAR/DNI              Reason for interval visit  (Principal Problem)   Cellulitis of right orbital region    Interval Problem Daily Status Update  (problem status, last 24 hours, new history, new data )   Patient reports feeling much better today and says he is ready to go home.  His appetite has been normal.  He reports no pain in his right eye and feels like the swelling has gone down significantly.  His feeling towards his care team is positive today.  Patient has remained afebrile since admission.  EKG today shows sinus bradycardia with borderline intraventricular conduction delay and borderline low voltage in frontal leads.  Labs show hypokalemia (3.2 down from 3.6 on ).  GFR is improved (53 today up from 46 ).  PCR for MRSA and staph were negative ().                 Physical Exam       Vitals:    18 0900 18 1540 18 1905 18 0422   BP:  142/64 115/69 135/48   Pulse: 63 (!) 55 68 (!) 48   Resp:  18 16 17   Temp:  36.9 °C (98.5 °F) 35.9 °C (96.7 °F) 36.1 °C (96.9 °F)   SpO2:  96% 95% 97%   Weight:       Height:         Body mass index is 35.74 kg/m².    Oxygen Therapy:  Pulse Oximetry: 97 %, O2 (LPM): 0, O2 Delivery: None (Room Air)    Physical Exam   Constitutional: He is oriented to person, place, and time and well-developed, well-nourished, and in no distress.   HENT:   Head: Normocephalic.   Eyes: EOM are normal. Right eye exhibits no chemosis, no discharge, no exudate and no hordeolum. No foreign body present in the right eye. Left eye exhibits no chemosis, no discharge, no exudate and no hordeolum. No foreign body present in the left eye. Right conjunctiva is injected. Right conjunctiva has no hemorrhage. Left conjunctiva is injected. Left conjunctiva has a hemorrhage. No scleral icterus. Right eye  exhibits normal extraocular motion and no nystagmus. Left eye exhibits normal extraocular motion and no nystagmus. Right pupil is not reactive. Right pupil is round. Left pupil is not reactive. Left pupil is round. Pupils are equal.    Right michelle-orbital region appears edematous and mildly erythematous but is non-tender to palpation.  No discharge evident. No ophthalmoplegia, ptosis, chemosis, or proptosis evident.  Bilateral miosis with unreactive pupils.  Corneal arcus visible bilaterally.       Cardiovascular: Regular rhythm and normal heart sounds.  Bradycardia present.    Pulmonary/Chest: Effort normal. No respiratory distress. He has wheezes. He has no rales.   Non-productive cough   Neurological: He is alert and oriented to person, place, and time.   Psychiatric: Mood, affect and judgment normal.   Positive for alcohol abuse.         Assessment/Plan   1. Cellulitis of right orbital region: improved  -Likely preseptal orbital cellulitis given physical exam findings- appropriate for outpatient management  -Patient ready for discharge on oral antibiotics  -Continue dorzolamide-timolol eyedrops in right eye and topical erythromycin  -Opthalmology will consult   -[facility]       2. LALITHA: resolved  -GFR improved with transition from vancomycin to linezolid      3. COPD: stable  -O2 sat 93% on room air  -Continue albuterol inhaler Q4HRS prn  -Discussed with patient the effects of smoking on the cells in the lung and predisposition to infections.  -Continue to assess patients interest on smoking cessation.  -Follow up with PCP for long-term managment      *Bradycardia likely secondary to carvedilol- consider d/c and substituting a selective alpha antagonist to control hypertension

## 2018-07-08 NOTE — CARE PLAN
Problem: Communication  Goal: The ability to communicate needs accurately and effectively will improve  Outcome: PROGRESSING AS EXPECTED  Patient verbalizes needs appropriately to staff.    Problem: Safety  Goal: Will remain free from injury  Outcome: PROGRESSING AS EXPECTED  Patient utilizes assistance when needed from staff.

## 2018-07-08 NOTE — PROGRESS NOTES
Internal Medicine Interval Note  Note Author: Ino Salas M.D.     Name Feliz Blackmon     1950   Age/Sex 68 y.o. male   MRN [id number]   Code Status DNAR/DNI     After 5PM or if no immediate response to page, please call for cross-coverage  Attending/Team: Dr Jim/ Tariq See Patient List for primary contact information  Call [phone] to page    1st Call - Day Intern (R1):   Dr Salas 2nd Call - Day Sr. Resident (R2/R3):   Dr Hogan         Reason for interval visit  (Principal Problem)   Preseptal/ orbital cellulitis      Interval Problem Daily Status Update  (24 hours, problem oriented, brief subjective history, new lab/imaging data pertinent to that problem)   Preseptal/ orbital cellulitis: Erythema, swelling, tenderness over orbital region has improved. No altered sensorium/ headache/ ophthalmoplegia/ contraction of visual filed/ chemosis.  Pt is on antibiotic topical and IV antibiotics switched to oral route.  [facility]      Review of Systems   Constitutional: Negative for chills and fever.   HENT: Negative for congestion, sinus pain and sore throat.    Eyes: Negative for blurred vision and double vision.   Respiratory: Negative for cough, sputum production and shortness of breath.    Cardiovascular: Negative for chest pain and palpitations.   Gastrointestinal: Negative for abdominal pain, constipation, diarrhea and vomiting.   Genitourinary: Negative for dysuria, frequency and urgency.   Musculoskeletal: Negative for back pain, joint pain and neck pain.   Skin: Negative for itching and rash.   Neurological: Negative for sensory change, speech change, focal weakness, seizures, weakness and headaches.   Endo/Heme/Allergies: Negative for environmental allergies.   Psychiatric/Behavioral: Positive for substance abuse. Negative for hallucinations. The patient is not nervous/anxious and does not have insomnia.        Disposition/Barriers to  discharge:   Pt is fit [facility]    Consultants/Specialty  Dr Mihaela Casarez MD, Ophthalmology  Dr Tana Christensen MD, Psychiatry resident  PCP: No PCP on file      Quality Measures  Quality-Core Measures   Reviewed items::  Labs reviewed and Medications reviewed  Kirkpatrick catheter::  No Kirkpatrick  DVT prophylaxis - mechanical:  SCDs  Ulcer Prophylaxis::  Not indicated          Physical Exam       Vitals:    07/07/18 1540 07/07/18 1905 07/08/18 0422 07/08/18 0735   BP: 142/64 115/69 135/48 121/69   Pulse: (!) 55 68 (!) 48 (!) 55   Resp: 18 16 17 17   Temp: 36.9 °C (98.5 °F) 35.9 °C (96.7 °F) 36.1 °C (96.9 °F) 35.8 °C (96.5 °F)   SpO2: 96% 95% 97% 93%   Weight:       Height:         Body mass index is 35.74 kg/m².    Oxygen Therapy:  Pulse Oximetry: 93 %, O2 (LPM): 0, O2 Delivery: None (Room Air)    Physical Exam   Constitutional: He is oriented to person, place, and time and well-developed, well-nourished, and in no distress. No distress.   HENT:   Head: Normocephalic and atraumatic.   Mouth/Throat: No oropharyngeal exudate.   Eyes: Conjunctivae and EOM are normal. Right eye exhibits no discharge. Left eye exhibits no discharge. No scleral icterus.   Bilateral miosis, pupils unreactive   Neck: Normal range of motion.   Cardiovascular: Normal rate, regular rhythm, normal heart sounds and intact distal pulses.    No murmur heard.  Pulmonary/Chest: Breath sounds normal. No respiratory distress. He has no rales.   Abdominal: Bowel sounds are normal. He exhibits no distension. There is no tenderness.   Musculoskeletal: He exhibits no tenderness.   Lymphadenopathy:     He has no cervical adenopathy.   Neurological: He is alert and oriented to person, place, and time. He exhibits normal muscle tone. GCS score is 15.   Skin: Skin is warm and dry. No rash noted. There is erythema.   Psychiatric: Mood, memory, affect and judgment normal.   Vitals reviewed.            Assessment/Plan     *  Cellulitis of right orbital region- (present on admission)   Assessment & Plan    [facility] and extensive paranasal sinus involvement  - Pt has refused to undergo MRI due to claustrophobia   - Pt exhibits no signs of altered sensorium, is oriented x3  - No external ophthalmoplegia, EOMs painless  - Inflammation around eye has significantly diminished today, no conjunctival injection  - Pt has b/l miosis unreactive to light or accomodation, has had so documented since [facility]  - Pt does not give h/o opiate use/ prior stroke  - Pt switched over to PO Levaquin during discharge  - Dorzolamide-Timolol eyedrops and Erythromycin topical to continue as per Ophthalmology  - Temperatures and WBC count trending in normal range  - Conservative management in absence of extension of infection beyond preseptal compartment  - Ophthalmology consulted, pt to be discharged today        Chronic obstructive pulmonary disease (COPD) ([facility])   Assessment & Plan    - Cough present with mild expiratory wheeze, non productive  - No dyspnea at rest, chest is clear today  - Pt counseled about smoking cessation and impact on mortality, has refused smoking cessation therapy  - Pt on PRN Albuterol inhaled  - Pt to be advised CT chest (as per USPSTF guidelines) on outpatient basis        LALITHA (acute kidney injury) ([facility])   Assessment & Plan    GFR 53 noted today, improved from yesterday  Vancomycin has been discontinued  Pt to be discharged on Levofloxacin PO         Prostate cancer ([facility])- (present on admission)   Assessment & Plan    - Pt on Bicalutamide, has no symptoms of adverse effects  - Pt has gynecomastia (may be due to hyperestrogenism or obesity), no breast tenderness  - Pt has straining, no incomplete voiding, no dysuria, urgency, pt is on Tamsulosin  - No h/o dizziness, nausea, vomiting, diarrhea since being started on Tamsulosin  - No recent weight loss, fevers, bone/ back pain in recent  past        Hypertension- (present on admission)   Assessment & Plan    - Pt on Chlorthalidone, Norvasc, Coreg.  - K level is 3.2 today, to continue thiazide and Norvasc on discharge.  - BP is 145/92 today, no headache, blurred vision, dizziness, sweating.  - HR has been trending in normal range  - Pt advised about cardiovascular benefits of smoking cessation.

## 2018-07-08 NOTE — PROGRESS NOTES
"Received bedside report from off going nurse. Assumed care of pt. @ 1900. Initial assessment completed. Patient in bed, A/O x 3. Patient lying supine in bed. Noted right eye redness, no periorbital swelling noted. No acute distress. Denies acute pain. Reviewed care plan w/patient. Questions answered. Monitoring ongoing. Call lights w/in reach. Bed locked in lowest position /64   Pulse (!) 55   Temp 36.9 °C (98.5 °F)   Resp 18   Ht 1.702 m (5' 7\")   Wt 105.7 kg (233 lb 0.4 oz)   SpO2 96%   BMI 36.50 kg/m²   "

## 2018-07-08 NOTE — CARE PLAN
Problem: Venous Thromboembolism (VTW)/Deep Vein Thrombosis (DVT) Prevention:  Goal: Patient will participate in Venous Thrombosis (VTE)/Deep Vein Thrombosis (DVT)Prevention Measures  Outcome: PROGRESSING AS EXPECTED  Patient educated on VTE/DVT prophylaxis. States understanding of need to ambulate frequently and Heparin therapy. No signs or symptoms of venous compromise.    Problem: Bowel/Gastric:  Goal: Normal bowel function is maintained or improved  Outcome: PROGRESSING AS EXPECTED  Patient states usual bowel movement x1, today. Patient had bowel movement during past 24 hour period, without any difficulty.

## 2018-07-08 NOTE — PROGRESS NOTES
Discharge instructions reviewed with patient. Questions/concerns addressed. Medications stored in pharmacy returned to patient. Patient escorted out via wheelchair with CNA.

## 2018-07-08 NOTE — DISCHARGE INSTRUCTIONS
Discharge Instructions    Discharged to home by car with relative. Discharged via wheelchair, hospital escort: Yes.  Special equipment needed: Not Applicable    Be sure to schedule a follow-up appointment with your primary care doctor or any specialists as instructed.     Discharge Plan:   Diet Plan: Discussed  Activity Level: Discussed  Smoking Cessation Offered: Patient Counseled  Confirmed Follow up Appointment: Patient to Call and Schedule Appointment  Confirmed Symptoms Management: Discussed  Medication Reconciliation Updated: Yes  Pneumococcal Vaccine Administered/Refused: Not given - Patient refused pneumococcal vaccine  Influenza Vaccine Indication: Not indicated: Previously immunized this influenza season and > 8 years of age    I understand that a diet low in cholesterol, fat, and sodium is recommended for good health. Unless I have been given specific instructions below for another diet, I accept this instruction as my diet prescription.   Other diet: regular    Special Instructions: None    · Is patient discharged on Warfarin / Coumadin?   No       Orbital Cellulitis  Introduction  Orbital cellulitis is an infection in the eye socket (orbit) and the tissues that surround the eye. The infection can spread to the eyelids, eyebrow area, and cheek. It can also cause a pocket of pus to develop around the eye (orbital abscess). In severe cases, the infection can spread to the brain. Orbital cellulitis is a medical emergency.  What are the causes?  The most common cause of this condition is a bacterial infection. The infection usually spreads to the eye socket from another part of the body. The infection may start in:  · The nose or sinuses.  · The eyelids.  · Facial skin.  · The bloodstream.  What increases the risk?  This condition is more likely to develop in people who have recently had one of the following:  · Upper respiratory infection.  · Sinus infection.  · Eyelid or facial infection.  · Eye  injury.  · Infection that affects the entire body or the bloodstream (systemic infection).  What are the signs or symptoms?  Symptoms of this condition usually start quickly. Symptoms include:  · Eye pain that gets worse with eye movement.  · Swelling around the eye.  · Eye redness.  · Bulging of the eye.  · Inability to move the eye.  · Double vision.  · Fever.  How is this diagnosed?  This condition may be diagnosed based on your symptoms and an eye exam. You may also have tests to confirm the diagnosis and to check for an orbital abscess. Other tests (cultures) may be done to find out what type of bacteria is causing the infection. Tests may include:  · Complete blood count (CBC).  · Blood culture.  · Nose, sinus, or throat culture.  · Imaging studies such as a CT scan or MRI.  How is this treated?  This condition is usually treated in a hospital. Antibiotic medicines are given directly into a vein through an IV tube.  · At first, you may get IV antibiotics to kill bacteria that often cause orbital cellulitis (broad spectrum antibiotics).  · Your medicine may be changed if cultures suggest that another antibiotic would be better.  · If the IV antibiotics are working to treat your infection, you may be switched to oral antibiotics and allowed to go home.  · In some cases, surgery may be needed to drain an orbital abscess.  Follow these instructions at home:  · Take medicines only as directed by your health care provider.  · Take your antibiotic medicine as directed by your health care provider. Finish the antibiotic even if you start to feel better.  · Return to your normal activities as directed by your health care provider. Ask your health care provider what activities are safe for you.  · Keep all follow-up visits as directed by your health care provider. This is important.  Get help right away if:  · Your eye pain or swelling returns or it gets worse.  · You have any changes in your vision.  · You have a  fever.  This information is not intended to replace advice given to you by your health care provider. Make sure you discuss any questions you have with your health care provider.  Document Released: 12/12/2002 Document Revised: 05/25/2017 Document Reviewed: 12/14/2015  © 2017 [employer]      Depression / Suicide Risk    As you are discharged from this [facility] facility, it is important to learn how to keep safe from harming yourself.    Recognize the warning signs:  · Abrupt changes in personality, positive or negative- including increase in energy   · Giving away possessions  · Change in eating patterns- significant weight changes-  positive or negative  · Change in sleeping patterns- unable to sleep or sleeping all the time   · Unwillingness or inability to communicate  · Depression  · Unusual sadness, discouragement and loneliness  · Talk of wanting to die  · Neglect of personal appearance   · Rebelliousness- reckless behavior  · Withdrawal from people/activities they love  · Confusion- inability to concentrate     If you or a loved one observes any of these behaviors or has concerns about self-harm, here's what you can do:  · Talk about it- your feelings and reasons for harming yourself  · Remove any means that you might use to hurt yourself (examples: pills, rope, extension cords, firearm)  · Get professional help from the community (Mental Health, Substance Abuse, psychological counseling)  · Do not be alone:Call your Safe Contact- someone whom you trust who will be there for you.  · Call your local CRISIS HOTLINE 371-5444 or 339-674-3057  · Call your local Children's Mobile Crisis Response Team [place] (303) 015-6825 or www.[employer]  · Call the toll free National Suicide Prevention Hotlines   · National Suicide Prevention Lifeline 583-536-XMFO (5657)  · National Hope Line Network 800-SUICIDE (011-8414)

## 2018-07-09 LAB
BACTERIA BLD CULT: NORMAL
BACTERIA BLD CULT: NORMAL
SIGNIFICANT IND 70042: NORMAL
SIGNIFICANT IND 70042: NORMAL
SITE SITE: NORMAL
SITE SITE: NORMAL
SOURCE SOURCE: NORMAL
SOURCE SOURCE: NORMAL

## 2018-07-09 NOTE — DISCHARGE SUMMARY
Internal Medicine Discharge Summary  Note Author: Colton Hogan M.D.       Name Feliz Blackmon     1950   Age/Sex 68 y.o. male   MRN [id number]         Admit Date:  2018       Discharge Date:   2018    Service:   [facility] Internal Medicine [place] Team  Attending Physician(s):   Dr. Jim       Senior Resident(s):   Dr. Hogan  Abdulaziz Resident(s):   Dr. Salas  PCP: No primary care provider on file.      Primary Diagnosis:   Preseptal cellulitis    Secondary Diagnoses:                Principal Problem:    Cellulitis of right orbital region POA: Yes      Overview: Pt developed pain, redness and swelling in tissues around the right eye       yesterday and came in to the ER at the [facility]. After workup patient       has been transferred to [facility] to be treated by an Ophthalmologist.  Active Problems:    Chronic obstructive pulmonary disease (COPD) (HCC) POA: Unknown      Overview: Pt has longstanding COPD secondary to heavy tobacco use    Hypertension (Chronic) POA: Yes      Overview: Pt is a known hypertensive on treatment with Amlodipine, Carvedilol,       Chlorthalidone. BP is well controlled on theses medications.    Prostate cancer (HCC) (Chronic) POA: Yes      Overview: Pt is diagnosed stage 4 prostate cancer on antiandrogen therapy       (Bicalutamide). Prior chemotherapy for the same, no h/o radiation or       surgery. Pt has signs of low androgen state (gynecomastia).    LALITHA (acute kidney injury) (HCC) POA: Unknown  Resolved Problems:    * No resolved hospital problems. *      Hospital Summary (Brief Narrative):       Mr. Blackmon is a very pleasant 68-year-old male with past history significant for metastatic prostate cancer, hypertension, COPD, hepatitis, tobacco use disorder, GERD presented as a transfer from the [facility] for ophthalmologic evaluation of possible orbital cellulitis.    Patient was initially placed on vancomycin and Zosyn due to high suspicion of orbital cellulitis and an  ophthalmologist was consulted. Ophthalmology determined patient to have preseptal cellulitis and also had elevated intraocular pressure from orbital swelling in this patient. He was therfore started on dorzolamide drops and erythromycin ointment was additionally prescribed for patient.     During the course, patient at one point became delirious elected to leave AMA without good understanding of his medical condition. At that time he was briefly placed on medical hold until patient could have better understanding. This episode of delirium was thought to be caused by Percocet. Psychiatric was consult to clarify any underlying psychiatric disorder, but however did not recommend anything else. Patient's family regained his insight and elected to stay to complete treatment.    During day 4 of vancomycin, patient's renal function worsened. This was thought to be due to to vancomycin. Therefore vancomycin was discontinued and he was briefly placed on linezolid. Patient's renal function improved the next day after vancomycin was discontinued.    On day of discharge, patient's erythema and swelling has remarkably improved and after discussing with ophthalmologist, was decided to discharge patient with close outpatient follow-up and outpatient antibiotics.    Things to follow:  #1 ensure resolution of infection of right eye  #2 repeat BMP scheduled, ensure resolution of a LALITHA normalization of potassium levels  #3 ensure follow up with ophthalmology in a week.    Patient /Hospital Summary (Details -- Problem Oriented) :          * Cellulitis of right orbital region   Assessment & Plan    [facility] and [facility]al sinus involvement  Pt has refused to undergo MRI due to claustrophobia   No external ophthalmoplegia, EOMs painless  Inflammation around eye has significantly diminished today, no conjunctival injection  Pt switched over to PO Levaquin during discharge  Dorzolamide-Timolol  eyedrops and Erythromycin topical to continue as per Ophthalmology  Recommend [facility]-up [facility] the [facility] in a [facility].        Chronic obstructive pulmonary disease (COPD) ([facility])   Assessment & Plan    Continue outpatient meds.        LALITHA (acute kidney injury) ([facility])   Assessment & Plan    GFR 53 noted today, improved from yesterday  Vancomycin has been discontinued  Repeat BMP in 1 week.        Prostate cancer ([facility])   Assessment & Plan    Continue bicalatumide and [facility].      Hypertension   Assessment & Plan    Continue chlorthalidone, norvasc, coreg.          Consultants:     Ophthalmology: Dr. Prince  Psychiatry: Dr. Schwab    Procedures:        None    Imaging/ Testing:      None  Patient declined MRI.    Discharge Medications:         Medication Reconciliation: Deferred       Medication List      START taking these medications      Instructions   dorzolamide-timolol 22.3-6.8 MG/ML Soln  Commonly known as:  COSOPT   Place 1 Drop in right eye 2 Times a Day.  Dose:  1 Drop     erythromycin 5 MG/GM Oint   Place 1 Application in right eye 3 times a day.  Dose:  1 Application     levoFLOXacin 750 MG tablet  Commonly known as:  LEVAQUIN   Take 1 Tab by mouth every day.  Dose:  750 mg        CONTINUE taking these medications      Instructions   acetaminophen 500 MG Tabs  Commonly known as:  TYLENOL   Take 500 mg by mouth 2 times a day as needed.  Dose:  500 mg     albuterol 108 (90 Base) MCG/ACT Aers inhalation aerosol   Inhale 2 Puffs by mouth every 6 hours as needed for Shortness of Breath.  Dose:  2 Puff     amLODIPine 5 MG Tabs  Commonly known as:  NORVASC   Take 5 mg by mouth every day.  Dose:  5 mg     bicalutamide 50 MG Tabs  Commonly known as:  CASODEX   Take 50 mg by mouth every day.  Dose:  50 mg     carvedilol 25 MG Tabs  Commonly known as:  COREG   Take 25 mg by mouth 2 times a day, with meals.  Dose:  25 mg     chlorthalidone 25 MG Tabs  Commonly known as:  HYGROTON    Take 12.5 mg by mouth every day.  Dose:  12.5 mg     famotidine 20 MG Tabs  Commonly known as:  PEPCID   Take 20 mg by mouth 1 time daily as needed.  Dose:  20 mg     Olodaterol HCl 2.5 MCG/ACT Aers   Take 2 Puffs by mouth every day.  Dose:  2 Puff     potassium chloride SA 20 MEQ Tbcr  Commonly known as:  Kdur   Take 40 mEq by mouth 2 Times a Day.  Dose:  40 mEq     tamsulosin 0.4 MG capsule  Commonly known as:  FLOMAX   Take 0.4 mg by mouth every day.  Dose:  0.4 mg     therapeutic multivitamin-minerals Tabs   Take 1 Tab by mouth every day.  Dose:  1 Tab     Vitamin D3 2000 units Tabs   Take 2,000 Units by mouth every day.  Dose:  2000 Units        STOP taking these medications    ibuprofen 600 MG Tabs  Commonly known as:  MOTRIN     oxyCODONE-acetaminophen 5-325 MG Tabs  Commonly known as:  PERCOCET              Disposition: Discharged home with outpatient follow-up    Diet: Healthy diet    Activity: As tolerated    Instructions:        The patient was instructed to return to the ER in the event of worsening symptoms. I have counseled the patient on the importance of compliance and the patient has agreed to proceed with all medical recommendations and follow up plan indicated above.   The patient understands that all medications come with benefits and risks. Risks may include permanent injury or death and these risks can be minimized with close reassessment and monitoring.        Primary Care Provider:    Dr. Calvin at [facility]    Discharge summary faxed to primary care provider:  Completed  Copy of discharge summary given to the patient: Deferred      Follow up appointment details :      Follow-up arranged on 7/16/2018, with labs at 7:30 AM and physician appointment at 9:30 AM    Pending Studies:        None pending, repeat BMP have been ordered on day of follow up appointment.    Time spent on discharge day patient visit, preparing discharge paperwork and arranging for patient follow up.    Summary of follow up  issues:   Recommend follow-up of eye infection, LALITHA and hypokalemia    Discharge Time (Minutes) :    44 minutes  Hospital Course Type:  Inpatient Stay >2 midnights      Condition on Discharge    ______________________________________________________________________    Interval history/exam for day of discharge:    Appears comfortable, patient is ambulatory and is anxious to go home.    Vitals:    07/07/18 1540 07/07/18 1905 07/08/18 0422 07/08/18 0735   BP: 142/64 115/69 135/48 121/69   Pulse: (!) 55 68 (!) 48 (!) 55   Resp: 18 16 17 17   Temp: 36.9 °C (98.5 °F) 35.9 °C (96.7 °F) 36.1 °C (96.9 °F) 35.8 °C (96.5 °F)   SpO2: 96% 95% 97% 93%   Weight:       Height:         Weight/BMI: Body mass index is 35.74 kg/m².  Pulse Oximetry: 93 %, O2 (LPM): 0, O2 Delivery: None (Room Air)    General: Appears comfortable  CVS: Regular rate and rhythm, no murmurs, rubs or gallops  PULM: Clear to auscultation  Right eye: Erythema and swelling almost resolved.    Most Recent Labs:    Lab Results   Component Value Date/Time    WBC 7.3 07/06/2018 03:22 AM    RBC 4.31 (L) 07/06/2018 03:22 AM    HEMOGLOBIN 13.4 (L) 07/06/2018 03:22 AM    HEMATOCRIT 40.4 (L) 07/06/2018 03:22 AM    MCV 93.7 07/06/2018 03:22 AM    MCH 31.1 07/06/2018 03:22 AM    MCHC 33.2 (L) 07/06/2018 03:22 AM    MPV 10.8 07/06/2018 03:22 AM    NEUTSPOLYS 59.20 07/06/2018 03:22 AM    LYMPHOCYTES 25.80 07/06/2018 03:22 AM    MONOCYTES 10.10 07/06/2018 03:22 AM    EOSINOPHILS 3.80 07/06/2018 03:22 AM    BASOPHILS 0.70 07/06/2018 03:22 AM      Lab Results   Component Value Date/Time    SODIUM 141 07/08/2018 03:07 AM    POTASSIUM 3.2 (L) 07/08/2018 03:07 AM    CHLORIDE 105 07/08/2018 03:07 AM    CO2 26 07/08/2018 03:07 AM    GLUCOSE 100 (H) 07/08/2018 03:07 AM    BUN 16 07/08/2018 03:07 AM    CREATININE 1.34 07/08/2018 03:07 AM      Lab Results   Component Value Date/Time    ALTSGPT 12 07/05/2018 02:54 AM    ASTSGOT 17 07/05/2018 02:54 AM    ALKPHOSPHAT 89 07/05/2018 02:54  AM    TBILIRUBIN 0.7 07/05/2018 02:54 AM    ALBUMIN 3.4 07/05/2018 02:54 AM    GLOBULIN 2.9 07/05/2018 02:54 AM    INR 1.12 07/05/2018 02:54 AM     Lab Results   Component Value Date/Time    PROTHROMBTM 14.1 07/05/2018 02:54 AM    INR 1.12 07/05/2018 02:54 AM

## 2018-07-12 ENCOUNTER — HOSPITAL ENCOUNTER (OUTPATIENT)
Dept: RADIOLOGY | Facility: MEDICAL CENTER | Age: 68
End: 2018-07-12

## 2021-03-03 DIAGNOSIS — Z23 NEED FOR VACCINATION: ICD-10-CM
